# Patient Record
Sex: FEMALE | Race: WHITE | NOT HISPANIC OR LATINO | Employment: FULL TIME | URBAN - METROPOLITAN AREA
[De-identification: names, ages, dates, MRNs, and addresses within clinical notes are randomized per-mention and may not be internally consistent; named-entity substitution may affect disease eponyms.]

---

## 2023-07-04 PROBLEM — Z00.00 ROUTINE ADULT HEALTH MAINTENANCE: Status: ACTIVE | Noted: 2023-07-04

## 2023-07-04 PROBLEM — E55.9 VITAMIN D DEFICIENCY: Status: ACTIVE | Noted: 2018-11-01

## 2023-07-04 PROBLEM — F41.9 ANXIETY: Status: ACTIVE | Noted: 2023-07-04

## 2023-07-04 PROBLEM — Z87.81 H/O FRACTURE: Status: ACTIVE | Noted: 2023-07-04

## 2023-07-04 PROBLEM — R73.01 IMPAIRED FASTING GLUCOSE: Status: ACTIVE | Noted: 2023-07-04

## 2023-07-04 PROBLEM — M17.12 UNILATERAL PRIMARY OSTEOARTHRITIS, LEFT KNEE: Status: ACTIVE | Noted: 2018-10-31

## 2023-07-04 PROBLEM — K58.9 IRRITABLE BOWEL SYNDROME: Status: ACTIVE | Noted: 2018-11-01

## 2023-07-04 PROBLEM — E53.8 VITAMIN B12 DEFICIENCY: Status: ACTIVE | Noted: 2023-07-04

## 2023-07-04 PROBLEM — I25.10 CORONARY ARTERY DISEASE: Status: ACTIVE | Noted: 2018-11-01

## 2023-07-17 ENCOUNTER — TELEPHONE (OUTPATIENT)
Dept: PRIMARY CARE | Facility: CLINIC | Age: 58
End: 2023-07-17
Payer: COMMERCIAL

## 2023-07-17 DIAGNOSIS — E78.5 HYPERLIPIDEMIA, UNSPECIFIED HYPERLIPIDEMIA TYPE: Primary | ICD-10-CM

## 2023-07-17 DIAGNOSIS — E53.8 VITAMIN B12 DEFICIENCY: ICD-10-CM

## 2023-07-17 DIAGNOSIS — Z00.00 ROUTINE ADULT HEALTH MAINTENANCE: ICD-10-CM

## 2023-07-17 DIAGNOSIS — E55.9 VITAMIN D DEFICIENCY: ICD-10-CM

## 2023-07-17 DIAGNOSIS — R73.01 IMPAIRED FASTING GLUCOSE: ICD-10-CM

## 2023-07-17 NOTE — TELEPHONE ENCOUNTER
Patient has upcoming appt on 8/1/23 for CPE calling for lab orders to do ahead of visit. Thank you.

## 2023-07-20 ENCOUNTER — LAB (OUTPATIENT)
Dept: LAB | Facility: LAB | Age: 58
End: 2023-07-20
Payer: COMMERCIAL

## 2023-07-20 DIAGNOSIS — E55.9 VITAMIN D DEFICIENCY: ICD-10-CM

## 2023-07-20 DIAGNOSIS — E53.8 VITAMIN B12 DEFICIENCY: ICD-10-CM

## 2023-07-20 DIAGNOSIS — Z00.00 ROUTINE ADULT HEALTH MAINTENANCE: ICD-10-CM

## 2023-07-20 DIAGNOSIS — R73.01 IMPAIRED FASTING GLUCOSE: ICD-10-CM

## 2023-07-20 DIAGNOSIS — E78.5 HYPERLIPIDEMIA, UNSPECIFIED HYPERLIPIDEMIA TYPE: ICD-10-CM

## 2023-07-20 LAB
ALANINE AMINOTRANSFERASE (SGPT) (U/L) IN SER/PLAS: 18 U/L (ref 7–45)
ALBUMIN (G/DL) IN SER/PLAS: 4.3 G/DL (ref 3.4–5)
ALKALINE PHOSPHATASE (U/L) IN SER/PLAS: 65 U/L (ref 33–110)
ANION GAP IN SER/PLAS: 12 MMOL/L (ref 10–20)
APPEARANCE, URINE: CLEAR
ASPARTATE AMINOTRANSFERASE (SGOT) (U/L) IN SER/PLAS: 26 U/L (ref 9–39)
BASOPHILS (10*3/UL) IN BLOOD BY AUTOMATED COUNT: 0.06 X10E9/L (ref 0–0.1)
BASOPHILS/100 LEUKOCYTES IN BLOOD BY AUTOMATED COUNT: 1.6 % (ref 0–2)
BILIRUBIN TOTAL (MG/DL) IN SER/PLAS: 0.5 MG/DL (ref 0–1.2)
BILIRUBIN, URINE: NEGATIVE
BLOOD, URINE: NEGATIVE
CALCIDIOL (25 OH VITAMIN D3) (NG/ML) IN SER/PLAS: 47 NG/ML
CALCIUM (MG/DL) IN SER/PLAS: 9.5 MG/DL (ref 8.6–10.3)
CARBON DIOXIDE, TOTAL (MMOL/L) IN SER/PLAS: 29 MMOL/L (ref 21–32)
CHLORIDE (MMOL/L) IN SER/PLAS: 100 MMOL/L (ref 98–107)
CHOLESTEROL (MG/DL) IN SER/PLAS: 207 MG/DL (ref 0–199)
CHOLESTEROL IN HDL (MG/DL) IN SER/PLAS: 81.6 MG/DL
CHOLESTEROL/HDL RATIO: 2.5
COBALAMIN (VITAMIN B12) (PG/ML) IN SER/PLAS: 494 PG/ML (ref 211–911)
COLOR, URINE: YELLOW
CREATININE (MG/DL) IN SER/PLAS: 0.79 MG/DL (ref 0.5–1.05)
EOSINOPHILS (10*3/UL) IN BLOOD BY AUTOMATED COUNT: 0.17 X10E9/L (ref 0–0.7)
EOSINOPHILS/100 LEUKOCYTES IN BLOOD BY AUTOMATED COUNT: 4.5 % (ref 0–6)
ERYTHROCYTE DISTRIBUTION WIDTH (RATIO) BY AUTOMATED COUNT: 12.5 % (ref 11.5–14.5)
ERYTHROCYTE MEAN CORPUSCULAR HEMOGLOBIN CONCENTRATION (G/DL) BY AUTOMATED: 33.8 G/DL (ref 32–36)
ERYTHROCYTE MEAN CORPUSCULAR VOLUME (FL) BY AUTOMATED COUNT: 93 FL (ref 80–100)
ERYTHROCYTES (10*6/UL) IN BLOOD BY AUTOMATED COUNT: 4.42 X10E12/L (ref 4–5.2)
ESTIMATED AVERAGE GLUCOSE FOR HBA1C: 103 MG/DL
GFR FEMALE: 86 ML/MIN/1.73M2
GLUCOSE (MG/DL) IN SER/PLAS: 93 MG/DL (ref 74–99)
GLUCOSE, URINE: NEGATIVE MG/DL
HEMATOCRIT (%) IN BLOOD BY AUTOMATED COUNT: 41.1 % (ref 36–46)
HEMOGLOBIN (G/DL) IN BLOOD: 13.9 G/DL (ref 12–16)
HEMOGLOBIN A1C/HEMOGLOBIN TOTAL IN BLOOD: 5.2 %
IMMATURE GRANULOCYTES/100 LEUKOCYTES IN BLOOD BY AUTOMATED COUNT: 0 % (ref 0–0.9)
KETONES, URINE: NEGATIVE MG/DL
LDL: 109 MG/DL (ref 0–99)
LEUKOCYTE ESTERASE, URINE: NEGATIVE
LEUKOCYTES (10*3/UL) IN BLOOD BY AUTOMATED COUNT: 3.8 X10E9/L (ref 4.4–11.3)
LYMPHOCYTES (10*3/UL) IN BLOOD BY AUTOMATED COUNT: 1.32 X10E9/L (ref 1.2–4.8)
LYMPHOCYTES/100 LEUKOCYTES IN BLOOD BY AUTOMATED COUNT: 35 % (ref 13–44)
MONOCYTES (10*3/UL) IN BLOOD BY AUTOMATED COUNT: 0.35 X10E9/L (ref 0.1–1)
MONOCYTES/100 LEUKOCYTES IN BLOOD BY AUTOMATED COUNT: 9.3 % (ref 2–10)
NEUTROPHILS (10*3/UL) IN BLOOD BY AUTOMATED COUNT: 1.87 X10E9/L (ref 1.2–7.7)
NEUTROPHILS/100 LEUKOCYTES IN BLOOD BY AUTOMATED COUNT: 49.6 % (ref 40–80)
NITRITE, URINE: NEGATIVE
PH, URINE: 6 (ref 5–8)
PLATELETS (10*3/UL) IN BLOOD AUTOMATED COUNT: 183 X10E9/L (ref 150–450)
POTASSIUM (MMOL/L) IN SER/PLAS: 4.4 MMOL/L (ref 3.5–5.3)
PROTEIN TOTAL: 7.1 G/DL (ref 6.4–8.2)
PROTEIN, URINE: NEGATIVE MG/DL
SODIUM (MMOL/L) IN SER/PLAS: 137 MMOL/L (ref 136–145)
SPECIFIC GRAVITY, URINE: 1.01 (ref 1–1.03)
THYROTROPIN (MIU/L) IN SER/PLAS BY DETECTION LIMIT <= 0.05 MIU/L: 2.73 MIU/L (ref 0.44–3.98)
TRIGLYCERIDE (MG/DL) IN SER/PLAS: 84 MG/DL (ref 0–149)
UREA NITROGEN (MG/DL) IN SER/PLAS: 14 MG/DL (ref 6–23)
UROBILINOGEN, URINE: <2 MG/DL (ref 0–1.9)
VLDL: 17 MG/DL (ref 0–40)

## 2023-07-20 PROCEDURE — 85025 COMPLETE CBC W/AUTO DIFF WBC: CPT

## 2023-07-20 PROCEDURE — 80061 LIPID PANEL: CPT

## 2023-07-20 PROCEDURE — 83036 HEMOGLOBIN GLYCOSYLATED A1C: CPT

## 2023-07-20 PROCEDURE — 82607 VITAMIN B-12: CPT

## 2023-07-20 PROCEDURE — 36415 COLL VENOUS BLD VENIPUNCTURE: CPT

## 2023-07-20 PROCEDURE — 80053 COMPREHEN METABOLIC PANEL: CPT

## 2023-07-20 PROCEDURE — 81003 URINALYSIS AUTO W/O SCOPE: CPT

## 2023-07-20 PROCEDURE — 84443 ASSAY THYROID STIM HORMONE: CPT

## 2023-07-20 PROCEDURE — 82306 VITAMIN D 25 HYDROXY: CPT

## 2023-07-25 ASSESSMENT — PROMIS GLOBAL HEALTH SCALE
RATE_MENTAL_HEALTH: VERY GOOD
EMOTIONAL_PROBLEMS: RARELY
RATE_GENERAL_HEALTH: VERY GOOD
RATE_SOCIAL_SATISFACTION: VERY GOOD
RATE_AVERAGE_PAIN: 1
RATE_PHYSICAL_HEALTH: VERY GOOD
RATE_QUALITY_OF_LIFE: VERY GOOD
CARRYOUT_PHYSICAL_ACTIVITIES: COMPLETELY
RATE_AVERAGE_FATIGUE: MILD
CARRYOUT_SOCIAL_ACTIVITIES: VERY GOOD

## 2023-08-01 ENCOUNTER — OFFICE VISIT (OUTPATIENT)
Dept: PRIMARY CARE | Facility: CLINIC | Age: 58
End: 2023-08-01
Payer: COMMERCIAL

## 2023-08-01 VITALS
TEMPERATURE: 98 F | WEIGHT: 116.5 LBS | OXYGEN SATURATION: 98 % | HEART RATE: 63 BPM | HEIGHT: 63 IN | DIASTOLIC BLOOD PRESSURE: 86 MMHG | SYSTOLIC BLOOD PRESSURE: 130 MMHG | BODY MASS INDEX: 20.64 KG/M2

## 2023-08-01 DIAGNOSIS — Z13.820 SCREENING FOR OSTEOPOROSIS: ICD-10-CM

## 2023-08-01 DIAGNOSIS — E55.9 VITAMIN D DEFICIENCY: ICD-10-CM

## 2023-08-01 DIAGNOSIS — E78.5 HYPERLIPIDEMIA, UNSPECIFIED HYPERLIPIDEMIA TYPE: ICD-10-CM

## 2023-08-01 DIAGNOSIS — F41.9 ANXIETY: ICD-10-CM

## 2023-08-01 DIAGNOSIS — E53.8 VITAMIN B12 DEFICIENCY: ICD-10-CM

## 2023-08-01 DIAGNOSIS — R73.01 IMPAIRED FASTING GLUCOSE: ICD-10-CM

## 2023-08-01 DIAGNOSIS — Z12.31 ENCOUNTER FOR SCREENING MAMMOGRAM FOR BREAST CANCER: Primary | ICD-10-CM

## 2023-08-01 DIAGNOSIS — D72.819 LEUKOPENIA, UNSPECIFIED TYPE: ICD-10-CM

## 2023-08-01 DIAGNOSIS — Z00.00 ROUTINE ADULT HEALTH MAINTENANCE: ICD-10-CM

## 2023-08-01 PROBLEM — I25.10 CORONARY ARTERY DISEASE: Status: RESOLVED | Noted: 2018-11-01 | Resolved: 2023-08-01

## 2023-08-01 PROCEDURE — 1036F TOBACCO NON-USER: CPT | Performed by: INTERNAL MEDICINE

## 2023-08-01 PROCEDURE — 99396 PREV VISIT EST AGE 40-64: CPT | Performed by: INTERNAL MEDICINE

## 2023-08-01 RX ORDER — ASCORBIC ACID 500 MG
500 TABLET ORAL
COMMUNITY

## 2023-08-01 RX ORDER — LANOLIN ALCOHOL/MO/W.PET/CERES
1 CREAM (GRAM) TOPICAL DAILY
COMMUNITY
Start: 2019-11-27

## 2023-08-01 RX ORDER — MULTIVITAMIN
1 TABLET ORAL DAILY
COMMUNITY
Start: 2021-07-22

## 2023-08-01 RX ORDER — BLOOD-GLUCOSE SENSOR
EACH MISCELLANEOUS
Qty: 1 EACH | Refills: 0 | Status: SHIPPED | OUTPATIENT
Start: 2023-08-01

## 2023-08-01 RX ORDER — ALPRAZOLAM 0.5 MG/1
0.5 TABLET ORAL 3 TIMES DAILY PRN
COMMUNITY
Start: 2020-06-25 | End: 2023-08-01 | Stop reason: SDUPTHER

## 2023-08-01 RX ORDER — ACETAMINOPHEN 500 MG
50 TABLET ORAL DAILY
COMMUNITY
Start: 2018-10-16

## 2023-08-01 RX ORDER — ALPRAZOLAM 0.5 MG/1
0.5 TABLET ORAL 3 TIMES DAILY PRN
Qty: 21 TABLET | Refills: 0 | Status: SHIPPED | OUTPATIENT
Start: 2023-08-01 | End: 2023-08-08

## 2023-08-01 ASSESSMENT — PATIENT HEALTH QUESTIONNAIRE - PHQ9
2. FEELING DOWN, DEPRESSED OR HOPELESS: NOT AT ALL
SUM OF ALL RESPONSES TO PHQ9 QUESTIONS 1 AND 2: 0
1. LITTLE INTEREST OR PLEASURE IN DOING THINGS: NOT AT ALL

## 2023-08-01 NOTE — ASSESSMENT & PLAN NOTE
Annual Wellness exam completed   Preventive Health history reviewed:  Vaccines today: none  Labs ordered    Mammogram ordered  BMD if covered  Depression Screening done

## 2023-08-01 NOTE — PROGRESS NOTES
Reason for Visit: Annual Physical Exam    Assessment and Plan:  Problem List Items Addressed This Visit          High    Routine adult health maintenance    Overview     Influenza 9/25/20, 10/1/21, declined further   Shingrix 7/25/22, 10/3/22  Tdap (Age 7+)2/26/2013, 7/25/22  Moderna COVID 19 vaccine 3/13/21, 4/10/21, 12/12/21   Cscope 2018 (4yrs); 7/21/23 (4yrs)  Mamm: 9/18/17; 8/3/20, 10/26/21, 10/25/22  PAP/HPV 8/21/15 (-); 9/24/20 (-)  Hep C Ab (-) 5/4/18         Current Assessment & Plan     Annual Wellness exam completed   Preventive Health history reviewed:  Vaccines today: none  Labs ordered    Mammogram ordered  BMD if covered  Depression Screening done         Relevant Orders    Urinalysis with Reflex Microscopic    Comprehensive Metabolic Panel    CBC and Auto Differential    Lipid Panel       Medium    Anxiety    Overview      Buspar not effective  will try prn Xanax         Relevant Medications    ALPRAZolam (Xanax) 0.5 mg tablet    RESOLVED: Hyperlipidemia    Relevant Orders    TSH with reflex to Free T4 if abnormal    Impaired fasting glucose    Overview     Diet managed         Relevant Medications    blood-glucose sensor (FreeStyle Porter 3 Sensor) device    Other Relevant Orders    Hemoglobin A1c    Leukopenia    Overview     7/23: 3.8  Will monitor         Relevant Orders    CBC and Auto Differential    Vitamin B12 deficiency    Overview     2019: 233  On oral supplement         Relevant Orders    Vitamin B12    Vitamin D deficiency    Overview     2019: 26  On supplement         Relevant Orders    Vitamin D, Total     Other Visit Diagnoses       Encounter for screening mammogram for breast cancer    -  Primary    Relevant Orders    BI mammo bilateral screening tomosynthesis    Screening for osteoporosis        Relevant Orders    XR DEXA bone density              HPI:  MRI brain done 8/22: There is minimal mucosal thickening within a few scattered ethmoid air cells  Symptoms went away    Labs  reviewed:  Component      Latest Ref Rn 7/20/2023   WBC      4.4 - 11.3 x10E9/L 3.8 (L)    RBC      4.00 - 5.20 x10E12/L 4.42    HEMOGLOBIN      12.0 - 16.0 g/dL 13.9    HEMATOCRIT      36.0 - 46.0 % 41.1    MCV      80 - 100 fL 93    MCHC      32.0 - 36.0 g/dL 33.8    Platelets      150 - 450 x10E9/L 183    RED CELL DISTRIBUTION WIDTH      11.5 - 14.5 % 12.5    Neutrophils %      40.0 - 80.0 % 49.6    Immature Granulocytes %, Automated      0.0 - 0.9 % 0.0    Lymphocytes %      13.0 - 44.0 % 35.0    Monocytes %      2.0 - 10.0 % 9.3    Eosinophils %      0.0 - 6.0 % 4.5    Basophils %      0.0 - 2.0 % 1.6    Neutrophils Absolute      1.20 - 7.70 x10E9/L 1.87    Lymphocytes Absolute      1.20 - 4.80 x10E9/L 1.32    Monocytes Absolute      0.10 - 1.00 x10E9/L 0.35    Eosinophils Absolute      0.00 - 0.70 x10E9/L 0.17    Basophils Absolute      0.00 - 0.10 x10E9/L 0.06    GLUCOSE      74 - 99 mg/dL 93    SODIUM      136 - 145 mmol/L 137    POTASSIUM      3.5 - 5.3 mmol/L 4.4    CHLORIDE      98 - 107 mmol/L 100    Bicarbonate      21 - 32 mmol/L 29    Anion Gap      10 - 20 mmol/L 12    Blood Urea Nitrogen      6 - 23 mg/dL 14    Creatinine      0.50 - 1.05 mg/dL 0.79    GFR Female      >90 mL/min/1.73m2 86    Calcium      8.6 - 10.3 mg/dL 9.5    Albumin      3.4 - 5.0 g/dL 4.3    Alkaline Phosphatase      33 - 110 U/L 65    Total Protein      6.4 - 8.2 g/dL 7.1    AST      9 - 39 U/L 26    Bilirubin Total      0.0 - 1.2 mg/dL 0.5    ALT      7 - 45 U/L 18    Color, Urine      STRAW,YELLOW  YELLOW    Appearance, Urine      CLEAR  CLEAR    Specific Gravity, Urine      1.005 - 1.035  1.010    pH, Urine      5.0 - 8.0  6.0    Protein, Urine      NEGATIVE mg/dL NEGATIVE    Glucose, Urine      NEGATIVE mg/dL NEGATIVE    Blood, Urine      NEGATIVE  NEGATIVE    Ketones, Urine      NEGATIVE mg/dL NEGATIVE    Bilirubin, Urine      NEGATIVE  NEGATIVE    Urobilinogen, Urine      0.0 - 1.9 mg/dL <2.0    Nitrite, Urine       "NEGATIVE  NEGATIVE    Leukocyte Esterase, Urine      NEGATIVE  NEGATIVE    CHOLESTEROL      0 - 199 mg/dL 207 (H)    HDL CHOLESTEROL      mg/dL 81.6    Cholesterol/HDL Ratio 2.5    LDL      0 - 99 mg/dL 109 (H)    VLDL      0 - 40 mg/dL 17    TRIGLYCERIDES      0 - 149 mg/dL 84    Hemoglobin A1C      % 5.2    Estimated Average Glucose      MG/    Vitamin D, 25-Hydroxy, Total      ng/mL 47    Thyroid Stimulating Hormone      0.44 - 3.98 mIU/L 2.73    Vitamin B12      211 - 911 pg/mL 494         ROS otherwise negative aside from what was mentioned above in HPI.    Vitals  /86   Pulse 63   Temp 36.7 °C (98 °F)   Ht 1.588 m (5' 2.5\")   Wt 52.8 kg (116 lb 8 oz)   SpO2 98%   BMI 20.97 kg/m²   Body mass index is 20.97 kg/m².  Physical Exam  Gen: Alert, NAD  HEENT:  Normal exam  Neck:  No masses/nodes palpable; Thyroid nontender and without nodules; No DAVID  Respiratory:  Lungs CTAB  CV: RRR  Neuro:  Gross motor and sensory intact  Skin:  No suspicious lesions present  Breast: No masses or axillary lymphadenopathy  Gyn: Normal pelvic exam: no uterine masses or cervical lesions, or CMT; no vaginal D/C. No ovarian or adnexal masses; No external vaginal or anal/perineal lesions (Pt declined chaperone)      Active Problem List  Patient Active Problem List   Diagnosis    Routine adult health maintenance    H/O fracture    Anxiety    Impaired fasting glucose    Irritable bowel syndrome    Unilateral primary osteoarthritis, left knee    Vitamin B12 deficiency    Vitamin D deficiency    Leukopenia       Comprehensive Medical/Surgical/Social/Family History  Past Medical History:   Diagnosis Date    H/O abdominal ultrasound     US abd : normal    H/O CT scan     : CT calcium score =0    H/O magnetic resonance imaging of brain and brain stem     : minimal mucosal thickening within a few scattered ethmoid air cells.     Past Surgical History:   Procedure Laterality Date     SECTION, CLASSIC  " 10/11/2018     Section    COLONOSCOPY  10/16/2018    7/21/23: melanosis coli, diverticulosis sigmpoid colon;  (-) : normal     Social History     Social History Narrative    , 3 kids    [facility]    Nonsmoker    Social ETOH    ---    Family History:    F: HTN, Lipids, DM,  Colon CA age 80 ( 87)    M:  CAD/CABG age 79      B:  DM, addiction problem/cocaine, Stroke age 48 ( age 48)    B:                                                                       Niece - leukemia    MGM: CAD                 MGF:  Coronary Artery Disease                      PGM: Coronary Artery Disease                           PGF:  Coronary Artery Disease                                 Allergies and Medications  Patient has no known allergies.  Current Outpatient Medications   Medication Instructions    ALPRAZolam (XANAX) 0.5 mg, oral, 3 times daily PRN    ascorbic acid (VITAMIN C) 500 mg, oral, Daily RT    blood-glucose sensor (FreeStyle Porter 3 Sensor) device Use for 2 weeks as directed    cholecalciferol (VITAMIN D-3) 50 mcg, oral, Daily    cyanocobalamin (Vitamin B-12) 1,000 mcg tablet 1 tablet, oral, Daily    L. acidophilus/Bifid. animalis 32 billion cell capsule 1 capsule, oral, Daily    multivitamin tablet 1 tablet, oral, Daily    NON FORMULARY 1 each, oral, Daily

## 2024-04-17 ENCOUNTER — OFFICE VISIT (OUTPATIENT)
Dept: ORTHOPEDIC SURGERY | Facility: CLINIC | Age: 59
End: 2024-04-17
Payer: COMMERCIAL

## 2024-04-17 ENCOUNTER — HOSPITAL ENCOUNTER (OUTPATIENT)
Dept: RADIOLOGY | Facility: CLINIC | Age: 59
Discharge: HOME | End: 2024-04-17
Payer: COMMERCIAL

## 2024-04-17 DIAGNOSIS — M25.562 LEFT KNEE PAIN, UNSPECIFIED CHRONICITY: ICD-10-CM

## 2024-04-17 DIAGNOSIS — M17.12 PRIMARY OSTEOARTHRITIS OF LEFT KNEE: Primary | ICD-10-CM

## 2024-04-17 PROCEDURE — 99213 OFFICE O/P EST LOW 20 MIN: CPT | Performed by: ORTHOPAEDIC SURGERY

## 2024-04-17 PROCEDURE — 73564 X-RAY EXAM KNEE 4 OR MORE: CPT | Mod: LT

## 2024-04-17 PROCEDURE — 73564 X-RAY EXAM KNEE 4 OR MORE: CPT | Mod: LEFT SIDE | Performed by: ORTHOPAEDIC SURGERY

## 2024-04-17 PROCEDURE — 99203 OFFICE O/P NEW LOW 30 MIN: CPT | Performed by: ORTHOPAEDIC SURGERY

## 2024-04-17 NOTE — PROGRESS NOTES
History: Tiana is here for her left knee.  She continues to try to run any activity and noticed increased pain after running.  She is trying to train for a half marathon.  It radiated down the back of the knee into the Achilles.  She takes occasional medicine as needed.    Past medical history: Multiple  Medications: Multiple  Allergies: No known drug allergies    Please refer to the intake H&P regarding the patient's review of systems, family history and social history as was done today    HEENT: Normal  Lungs: Clear to auscultation  Heart: RRR  Abdomen: Soft, nontender  Skin: clear  Extremity: She has mild tenderness over the medial and posterior aspect of the knee today.  Negative Lachman and posterior drawer.  Mild laxity with varus stress.  Mildly positive posterior Dimple's maneuver.  No numbness.  Contralateral exam is normal for strength, motion, stability and neurovascular assessment.    Radiographs: AP lateral and sunrise views of the left knee shows moderate medial joint space narrowing with neutral alignment.    Assessment: Moderate left knee DJD in an  Active patient      Plan: She is very active including running but is back down on some of her impact activities.  She may be getting a bit of arthritis irritating the posterior musculature and down into her Achilles.  She would like to try an injection and will slowly get back into running activities if improving.  Will see her back in 6 weeks to assess progress.  If not improving we did discuss further imaging or other modalities.  All questions were answered today with the patient.    This note was generated with voice recognition software and may contain grammatical errors.

## 2024-05-15 ENCOUNTER — TELEPHONE (OUTPATIENT)
Dept: ORTHOPEDIC SURGERY | Facility: CLINIC | Age: 59
End: 2024-05-15
Payer: COMMERCIAL

## 2024-05-15 DIAGNOSIS — M25.579 ACUTE ANKLE PAIN, UNSPECIFIED LATERALITY: Primary | ICD-10-CM

## 2024-05-15 RX ORDER — MELOXICAM 15 MG/1
15 TABLET ORAL DAILY
Qty: 30 TABLET | Refills: 2 | Status: SHIPPED | OUTPATIENT
Start: 2024-05-15 | End: 2024-08-13

## 2024-05-15 NOTE — TELEPHONE ENCOUNTER
Patient states she is still having trouble with her achilles tendon and was wondering if an anti inflammatory would help her can you please call her.

## 2024-06-03 ENCOUNTER — OFFICE VISIT (OUTPATIENT)
Dept: ORTHOPEDIC SURGERY | Facility: CLINIC | Age: 59
End: 2024-06-03
Payer: COMMERCIAL

## 2024-06-03 DIAGNOSIS — M17.12 PRIMARY OSTEOARTHRITIS OF LEFT KNEE: Primary | ICD-10-CM

## 2024-06-03 DIAGNOSIS — M76.62 ACHILLES TENDINITIS OF LEFT LOWER EXTREMITY: ICD-10-CM

## 2024-06-03 PROCEDURE — 99213 OFFICE O/P EST LOW 20 MIN: CPT | Performed by: ORTHOPAEDIC SURGERY

## 2024-06-03 NOTE — PROGRESS NOTES
History: Tiana here for her left knee and Achilles.  Overall her knee is doing better.  She still taking the meloxicam.  She is avoided running and the impact activities.  She is more concerned about the persistent soreness in the Achilles.  She did have injections in this area many years ago.    Past medical history: Multiple  Medications: Multiple  Allergies: No known drug allergies    Please refer to the intake H&P regarding the patient's review of systems, family history and social history as was done today    HEENT: Normal  Lungs: Clear to auscultation  Heart: RRR  Abdomen: Soft, nontender  Skin: clear  Extremity: She has mild tenderness around the Achilles 3 to 4 cm above the insertion.  No significant swelling.  She has a normal White's test.  She is full foot and ankle motion.  Knee exam shows good range of motion again with minimal pain today.  Negative Lachman and posterior drawer.  Contralateral exam is normal for strength, motion, stability and neurovascular assessment.    Radiographs: Previous x-rays show some moderate degenerative change in the knee.    Assessment: Stable left knee DJD, left Achilles tendinitis    Plan: Overall her knee is doing better.  She is back down on some of the running and is using the meloxicam.  She is more concerned about the Achilles tendinitis.  She is doing a lot of stretching and home exercises and would like to try some formal therapy with modalities including ultrasound.  She can use the meloxicam if needed but will begin to wean off if doing better.  She gradually increase some running activities as well.  I would be happy to see her back for either area as needed.  All questions were answered today with the patient.    This note was generated with voice recognition software and may contain grammatical errors.

## 2024-06-10 ENCOUNTER — EVALUATION (OUTPATIENT)
Dept: PHYSICAL THERAPY | Facility: CLINIC | Age: 59
End: 2024-06-10
Payer: COMMERCIAL

## 2024-06-10 DIAGNOSIS — M62.89 MUSCLE TIGHTNESS: ICD-10-CM

## 2024-06-10 DIAGNOSIS — M25.572 LEFT ANKLE PAIN: Primary | ICD-10-CM

## 2024-06-10 DIAGNOSIS — M76.62 ACHILLES TENDINITIS OF LEFT LOWER EXTREMITY: ICD-10-CM

## 2024-06-10 PROCEDURE — 97110 THERAPEUTIC EXERCISES: CPT | Mod: GP | Performed by: PHYSICAL THERAPIST

## 2024-06-10 PROCEDURE — 97161 PT EVAL LOW COMPLEX 20 MIN: CPT | Mod: GP | Performed by: PHYSICAL THERAPIST

## 2024-06-10 ASSESSMENT — PAIN SCALES - GENERAL: PAINLEVEL_OUTOF10: 2

## 2024-06-10 ASSESSMENT — PAIN - FUNCTIONAL ASSESSMENT: PAIN_FUNCTIONAL_ASSESSMENT: 0-10

## 2024-06-10 NOTE — PROGRESS NOTES
Physical Therapy    Physical Therapy Evaluation and Treatment      Patient Name: Tiana Ackerman  MRN: [id number]  Today's Date: 6/16/2024     Insurance:  [employer] PPO through [employer]  20% coins, $350 ded ($350 met), $2000 oop ($640.92 met), no copay, bmn radha yr, no PA   Visit 1 of 20 (re-assess at 10)    Assessment:    Pt is a 58 yo female presenting with L foot/lower leg pain, limited running and plyometric tolerances consistent with tendon strain and mechanical inefficiencies. Skilled PT is recommended to address these issues with manual tissue and joint mobilization, stretching, progressive eccentric>concentric strengthening and proprioception training so that pt is better able to support herself and restore functional tolerances.    Plan:  OP PT Plan  Treatment/Interventions: Aquatic therapy, Dry needling, Education/ Instruction, Electrical stimulation, Manual therapy, Gait training, Neuromuscular re-education, Self care/ home management, Therapeutic activities, Therapeutic exercises, Ultrasound, Vasopneumatic device  PT Plan: Skilled PT  PT Frequency:  (1-2x/week)  Duration: 20  Rehab Potential: Good  Plan of Care Agreement: Patient    Current Problem:   1. Left ankle pain  Follow Up In Physical Therapy      2. Achilles tendinitis of left lower extremity  Referral to Physical Therapy      3. Muscle tightness            Subjective    General:   Mid April 2024, she noted a strain/tightness in the L calf. She was training for a half marathon, but the symptoms got signif worse, so she stopped and canceled her half marathon.   She had an injection in the L knee 4- and that helped her knee pain but not the Achilles.  She cont to have pain with running and jumping rope, and notes that she is not fully able to do all desired activities as she was prior.   She does note that she recently got [employer]OK[employer] shoes with an arch support, and it seems to require more flex in her foot, which has not `been  comfortable.    Precautions:       Pain:   Current: 1-2 in ant knee and 1/10 in the L Achilles.  Worst: 3 more of a strain than a pain  Best: 0  Location: Achilles tendon.  Often feels her pain first thing in the AM and in the evenings.  Home Living:     Current Level of Function:   walking mariaa= no limitation or pain, though some pain when walking uphill.   standing mariaa= unlimited.  stairs: recip gait up and down without handrail without limitation.  sit to stand transfers:  no pain or difficulty.  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    Objective   Standing repeated extn: 2x10 no change in symptoms    Gait: non-antalgic    Neuro: unremarkable for sensory/motor issues.    AROM R/L:  Long sit DF: 4*/9*  Short sit DF: 10*/13*  PF: 60*/55*  Inversion: 42*/35*  Eversion: 22*/15*  Hallux ext: 62*/58*    Strength R, L:  DF: 33#, 36#  PF: 4+, 4+  Inv: 22#, 19#  Ever: 24#, 23#    Outcome Measures:    LEFS 75/80    Treatments:  Ther Ex 43040: 20/1  Prone press ups, 10x  Gastroc, soleus, and FHL stretching in standing, :30 1x each  Eccentric heel raises, 10x  Pt was edu'd on using a massage roller to her calf prior to stretching to facilitate better tissue extensibility prior to stretching.    EDUCATION:       Goals:  Goals to be achieved by discharge, approx 12 weeks.    Patient will verbalize pain (0-10) = 0/10 at rest and 2/10 at worst with activity.    Patient will correctly perform home exercise program to progress current functional status.    LEFS score will be >  80/80 to demonstrate overall improved functional abilities.     Pt will return to running > 5 miles without inc pain or difficulty, so tat she can resume training for distance races that she is used to.     Increase AROM: ankle PF= 65*, ever= 20*, hallux ext= 65*    Increase DF= 40#, PF to 5/5, inv= 30#

## 2024-06-21 ENCOUNTER — TREATMENT (OUTPATIENT)
Dept: PHYSICAL THERAPY | Facility: CLINIC | Age: 59
End: 2024-06-21
Payer: COMMERCIAL

## 2024-06-21 ENCOUNTER — APPOINTMENT (OUTPATIENT)
Dept: PHYSICAL THERAPY | Facility: CLINIC | Age: 59
End: 2024-06-21
Payer: COMMERCIAL

## 2024-06-21 DIAGNOSIS — M62.89 MUSCLE TIGHTNESS: Primary | ICD-10-CM

## 2024-06-21 DIAGNOSIS — M25.572 LEFT ANKLE PAIN: ICD-10-CM

## 2024-06-21 PROCEDURE — 97112 NEUROMUSCULAR REEDUCATION: CPT | Mod: GP | Performed by: PHYSICAL THERAPIST

## 2024-06-21 PROCEDURE — 97110 THERAPEUTIC EXERCISES: CPT | Mod: GP | Performed by: PHYSICAL THERAPIST

## 2024-06-21 PROCEDURE — 97140 MANUAL THERAPY 1/> REGIONS: CPT | Mod: GP | Performed by: PHYSICAL THERAPIST

## 2024-06-21 ASSESSMENT — PAIN - FUNCTIONAL ASSESSMENT: PAIN_FUNCTIONAL_ASSESSMENT: 0-10

## 2024-06-21 ASSESSMENT — PAIN SCALES - GENERAL: PAINLEVEL_OUTOF10: 3

## 2024-06-21 NOTE — PROGRESS NOTES
Physical Therapy    Physical Therapy Treatment    Patient Name: Tiana Ackerman  MRN: [id number]  Insurance:  [employer] PPO through "[employer]  20% coins, $350 ded ($350 met), $2000 oop ($640.92 met), no copay, bmn radha yr, no PA   Visit 2 of 20 (re-assess at 10)  Today's Date: 6/26/2024        PT Therapeutic Procedures Time Entry  Manual Therapy Time Entry: 24  Neuromuscular Re-Education Time Entry: 10  Therapeutic Exercise Time Entry: 15                 Current Problem  1. Muscle tightness        2. Left ankle pain  Follow Up In Physical Therapy        Subjective    General   Pt notes that she has been doing HEP without much difficulty, except couldn't remember exactly how to do FHL stretch.   She reports that she is able to tolerate jump roping without inc pain. She notes that she cont to have pain with running.      Precautions        Objective     Treatments:  Ther Ex 61562: 15/1  NuStep (seat 6) L1 5 min  Prone press ups, 10x  Gastroc, soleus, and FHL stretching in standing, :30 1x each  Eccentric heel raises, 10x    Neuromusc Re-edu 53239: 10 min  Tband hip x4, red, 10x each bilat  C-C walkout x4, 15#, 5x each    Manual Ther Tech 11939: 24/2  DTM to sides of Achilles.   IDN: single 40mm placed into the proximal FHL musculotendinous junction.   Pt was edu'd on the process of dry needling, as well as how to monitor for light bruising. Pt was also edu'd to maintain good hydration and move as normally as possible, avoiding pain when needed.  Verbal consent obtained for DN procedure after explaining the process.      OP EDUCATION:     Assessment:   Pt reported feeling looser after MTT. FHL was palpably tight initially. She was able to manage the progressions without inc pain. At this time, given the tightness and strain in the FHL, it was recommended that pt avoid plyometric activity such as jumping and heavy running, or even light running if it is painful.     Plan:  F/U with pt re: effect of IDN. Progress  eccentric strengthening if it was tolerated well.    Goals:

## 2024-06-27 ENCOUNTER — TREATMENT (OUTPATIENT)
Dept: PHYSICAL THERAPY | Facility: CLINIC | Age: 59
End: 2024-06-27
Payer: COMMERCIAL

## 2024-06-27 DIAGNOSIS — M62.89 MUSCLE TIGHTNESS: Primary | ICD-10-CM

## 2024-06-27 DIAGNOSIS — M25.572 LEFT ANKLE PAIN: ICD-10-CM

## 2024-06-27 PROCEDURE — 97140 MANUAL THERAPY 1/> REGIONS: CPT | Mod: GP | Performed by: PHYSICAL THERAPIST

## 2024-06-27 PROCEDURE — 97110 THERAPEUTIC EXERCISES: CPT | Mod: GP | Performed by: PHYSICAL THERAPIST

## 2024-06-27 ASSESSMENT — PAIN - FUNCTIONAL ASSESSMENT: PAIN_FUNCTIONAL_ASSESSMENT: 0-10

## 2024-06-27 ASSESSMENT — PAIN SCALES - GENERAL: PAINLEVEL_OUTOF10: 0 - NO PAIN

## 2024-06-27 NOTE — PROGRESS NOTES
"Physical Therapy    Physical Therapy Treatment    Patient Name: Tiana Ackerman  MRN: [id number]  Insurance:  Dax[employer]'s Choice PPO through [employer]  20% coins, $350 ded ($350 met), $2000 oop ($640.92 met), no copay, bmn radha yr, no PA   Visit 3 of 20 (re-assess at 10)  Today's Date: 6/27/2024  Time Calculation  Start Time: 1710  Stop Time: 1750  Time Calculation (min): 40 min     PT Therapeutic Procedures Time Entry  Manual Therapy Time Entry: 23  Neuromuscular Re-Education Time Entry: 5  Therapeutic Exercise Time Entry: 12                 Current Problem  1. Muscle tightness        2. Left ankle pain  Follow Up In Physical Therapy        Subjective    General  Pt notes that she does not have calf pain anymore, just the Achilles pain. She notes that she is still refraining from running, and stopped jump roping. She has been keeping up with HEP as instructed.     Precautions  Precautions  Precautions Comment: no falls since last visit  Pain Assessment  Pain Assessment: 0-10  0-10 (Numeric) Pain Score: 0 - No pain  Objective     Treatments:  Ther Ex 74658: 12/1  Elliptical L1 4 min  Gastroc, soleus, and FHL stretching in standing, :30 1x each  Prone press ups, 10x    Neuromusc Re-edu 24669: 5/0  BAPS x4, L2 FWB, 10x each    Manual Ther Tech 16990: 23/2  DTM to sides of Achilles.   TPR to L post tib.  STM and effleurage to full L calf.   IASTM scraping/stroking to the gastroc, musculotendinous junction of gastroc, and sides of Achilles.     Not performed today:  Eccentric heel raises, 10x  Tband hip x4, red, 10x each bilat  C-C walkout x4, 15#, 5x each    OP EDUCATION:     Assessment:   Pt had resolution of symptoms after MTT, though pt was cautioned that relief is often a \"2 steps forward, one step back\" kind of path. Success occurs with decreased duration and decreased frequency of symptoms, as well as return to more functions with less inc in symptoms.     Plan:  If pt cont to progress, add more dynamic balance " activities.      Goals:

## 2024-07-02 ENCOUNTER — TREATMENT (OUTPATIENT)
Dept: PHYSICAL THERAPY | Facility: CLINIC | Age: 59
End: 2024-07-02
Payer: COMMERCIAL

## 2024-07-02 DIAGNOSIS — M62.89 MUSCLE TIGHTNESS: Primary | ICD-10-CM

## 2024-07-02 DIAGNOSIS — M25.572 LEFT ANKLE PAIN: ICD-10-CM

## 2024-07-02 PROCEDURE — 97110 THERAPEUTIC EXERCISES: CPT | Mod: GP | Performed by: PHYSICAL THERAPIST

## 2024-07-02 PROCEDURE — 97140 MANUAL THERAPY 1/> REGIONS: CPT | Mod: GP | Performed by: PHYSICAL THERAPIST

## 2024-07-02 PROCEDURE — 97112 NEUROMUSCULAR REEDUCATION: CPT | Mod: GP | Performed by: PHYSICAL THERAPIST

## 2024-07-02 NOTE — PROGRESS NOTES
Physical Therapy    Physical Therapy Treatment    Patient Name: Tiana Ackerman  MRN: [id number]  Insurance:  [employer]ax[employer]'s Choice PPO through [employer]  20% coins, $350 ded ($350 met), $2000 oop ($640.92 met), no copay, bmn radha yr, no PA   Visit 4 of 20 (re-assess at 10)  Today's Date: 7/10/2024        PT Therapeutic Procedures Time Entry  Manual Therapy Time Entry: 18  Neuromuscular Re-Education Time Entry: 10  Therapeutic Exercise Time Entry: 15                 Current Problem  1. Muscle tightness        2. Left ankle pain  Follow Up In Physical Therapy        Subjective    General  Pt states that she is doing well overall, stating that she felt her best after the MTT last session, and it has pretty much stayed that way. She feels ready to retry light running.     Precautions  Precautions  Precautions Comment: no falls since last visit     Objective     Treatments:  Ther Ex 31620: 15/1  Elliptical L1 4 min  Gastroc, soleus, and FHL stretching in standing, :30 1x each  Walking hamstring and lateral lunge stretches 1 lap each    Neuromusc Re-edu 60362: 10/1  BAPS x4, L2 FWB, 15x each  C-C walkout x4, 15#, 5x each    Manual Ther Tech 82178: 18/1  DTM to sides of Achilles.   Cupping (smallest cup) to the sides of the Achilles tendon, with bouncing and sliding cup manipulation.  Talocrural distraction  Manual calc inv/ever- with overpressure.    OP EDUCATION:     Assessment:  Pt did well with program and progressions that were made today, showing good form and control throughout. Added cupping today to hopefully better address the bogginess in the area on the sides of the L Achilles. She mariaa this reasonably well.      Plan:  If pt cont to progress, add more dynamic balance activities.      Goals:

## 2024-07-10 DIAGNOSIS — M76.62 ACHILLES TENDINITIS OF LEFT LOWER EXTREMITY: ICD-10-CM

## 2024-07-10 DIAGNOSIS — M17.12 PRIMARY OSTEOARTHRITIS OF LEFT KNEE: ICD-10-CM

## 2024-07-11 ENCOUNTER — APPOINTMENT (OUTPATIENT)
Dept: PHYSICAL THERAPY | Facility: CLINIC | Age: 59
End: 2024-07-11
Payer: COMMERCIAL

## 2024-07-18 ENCOUNTER — LAB (OUTPATIENT)
Dept: LAB | Facility: LAB | Age: 59
End: 2024-07-18
Payer: COMMERCIAL

## 2024-07-18 ENCOUNTER — TREATMENT (OUTPATIENT)
Dept: PHYSICAL THERAPY | Facility: CLINIC | Age: 59
End: 2024-07-18
Payer: COMMERCIAL

## 2024-07-18 DIAGNOSIS — E53.8 VITAMIN B12 DEFICIENCY: ICD-10-CM

## 2024-07-18 DIAGNOSIS — D72.819 LEUKOPENIA, UNSPECIFIED TYPE: ICD-10-CM

## 2024-07-18 DIAGNOSIS — Z00.00 ROUTINE ADULT HEALTH MAINTENANCE: ICD-10-CM

## 2024-07-18 DIAGNOSIS — E55.9 VITAMIN D DEFICIENCY: ICD-10-CM

## 2024-07-18 DIAGNOSIS — R73.01 IMPAIRED FASTING GLUCOSE: ICD-10-CM

## 2024-07-18 DIAGNOSIS — M25.572 LEFT ANKLE PAIN: ICD-10-CM

## 2024-07-18 DIAGNOSIS — M62.89 MUSCLE TIGHTNESS: Primary | ICD-10-CM

## 2024-07-18 DIAGNOSIS — E78.5 HYPERLIPIDEMIA, UNSPECIFIED HYPERLIPIDEMIA TYPE: ICD-10-CM

## 2024-07-18 LAB
25(OH)D3 SERPL-MCNC: 37 NG/ML (ref 30–100)
ALBUMIN SERPL BCP-MCNC: 4.2 G/DL (ref 3.4–5)
ALP SERPL-CCNC: 56 U/L (ref 33–136)
ALT SERPL W P-5'-P-CCNC: 16 U/L (ref 7–45)
ANION GAP SERPL CALC-SCNC: 13 MMOL/L (ref 10–20)
APPEARANCE UR: ABNORMAL
AST SERPL W P-5'-P-CCNC: 27 U/L (ref 9–39)
BACTERIA #/AREA URNS AUTO: ABNORMAL /HPF
BASOPHILS # BLD AUTO: 0.04 X10*3/UL (ref 0–0.1)
BASOPHILS NFR BLD AUTO: 1.1 %
BILIRUB SERPL-MCNC: 0.9 MG/DL (ref 0–1.2)
BILIRUB UR STRIP.AUTO-MCNC: NEGATIVE MG/DL
BUN SERPL-MCNC: 12 MG/DL (ref 6–23)
CALCIUM SERPL-MCNC: 9.1 MG/DL (ref 8.6–10.6)
CHLORIDE SERPL-SCNC: 99 MMOL/L (ref 98–107)
CHOLEST SERPL-MCNC: 207 MG/DL (ref 0–199)
CHOLESTEROL/HDL RATIO: 2.8
CO2 SERPL-SCNC: 28 MMOL/L (ref 21–32)
COLOR UR: ABNORMAL
CREAT SERPL-MCNC: 0.8 MG/DL (ref 0.5–1.05)
EGFRCR SERPLBLD CKD-EPI 2021: 84 ML/MIN/1.73M*2
EOSINOPHIL # BLD AUTO: 0.16 X10*3/UL (ref 0–0.7)
EOSINOPHIL NFR BLD AUTO: 4.6 %
ERYTHROCYTE [DISTWIDTH] IN BLOOD BY AUTOMATED COUNT: 12 % (ref 11.5–14.5)
EST. AVERAGE GLUCOSE BLD GHB EST-MCNC: 100 MG/DL
GLUCOSE SERPL-MCNC: 92 MG/DL (ref 74–99)
GLUCOSE UR STRIP.AUTO-MCNC: NORMAL MG/DL
HBA1C MFR BLD: 5.1 %
HCT VFR BLD AUTO: 38.7 % (ref 36–46)
HDLC SERPL-MCNC: 73.7 MG/DL
HGB BLD-MCNC: 13.4 G/DL (ref 12–16)
IMM GRANULOCYTES # BLD AUTO: 0 X10*3/UL (ref 0–0.7)
IMM GRANULOCYTES NFR BLD AUTO: 0 % (ref 0–0.9)
KETONES UR STRIP.AUTO-MCNC: NEGATIVE MG/DL
LDLC SERPL CALC-MCNC: 118 MG/DL
LEUKOCYTE ESTERASE UR QL STRIP.AUTO: ABNORMAL
LYMPHOCYTES # BLD AUTO: 1.3 X10*3/UL (ref 1.2–4.8)
LYMPHOCYTES NFR BLD AUTO: 37 %
MCH RBC QN AUTO: 31.2 PG (ref 26–34)
MCHC RBC AUTO-ENTMCNC: 34.6 G/DL (ref 32–36)
MCV RBC AUTO: 90 FL (ref 80–100)
MONOCYTES # BLD AUTO: 0.39 X10*3/UL (ref 0.1–1)
MONOCYTES NFR BLD AUTO: 11.1 %
MUCOUS THREADS #/AREA URNS AUTO: ABNORMAL /LPF
NEUTROPHILS # BLD AUTO: 1.62 X10*3/UL (ref 1.2–7.7)
NEUTROPHILS NFR BLD AUTO: 46.2 %
NITRITE UR QL STRIP.AUTO: NEGATIVE
NON HDL CHOLESTEROL: 133 MG/DL (ref 0–149)
NRBC BLD-RTO: 0 /100 WBCS (ref 0–0)
PH UR STRIP.AUTO: 6 [PH]
PLATELET # BLD AUTO: 186 X10*3/UL (ref 150–450)
POTASSIUM SERPL-SCNC: 4.7 MMOL/L (ref 3.5–5.3)
PROT SERPL-MCNC: 6.4 G/DL (ref 6.4–8.2)
PROT UR STRIP.AUTO-MCNC: NEGATIVE MG/DL
RBC # BLD AUTO: 4.29 X10*6/UL (ref 4–5.2)
RBC # UR STRIP.AUTO: NEGATIVE /UL
RBC #/AREA URNS AUTO: ABNORMAL /HPF
RENAL EPI CELLS #/AREA UR COMP ASSIST: ABNORMAL /HPF
SODIUM SERPL-SCNC: 135 MMOL/L (ref 136–145)
SP GR UR STRIP.AUTO: 1.01
SQUAMOUS #/AREA URNS AUTO: ABNORMAL /HPF
TRIGL SERPL-MCNC: 77 MG/DL (ref 0–149)
TSH SERPL-ACNC: 2.84 MIU/L (ref 0.44–3.98)
UROBILINOGEN UR STRIP.AUTO-MCNC: NORMAL MG/DL
VIT B12 SERPL-MCNC: 1670 PG/ML (ref 211–911)
VLDL: 15 MG/DL (ref 0–40)
WBC # BLD AUTO: 3.5 X10*3/UL (ref 4.4–11.3)
WBC #/AREA URNS AUTO: ABNORMAL /HPF

## 2024-07-18 PROCEDURE — 82306 VITAMIN D 25 HYDROXY: CPT

## 2024-07-18 PROCEDURE — 81001 URINALYSIS AUTO W/SCOPE: CPT

## 2024-07-18 PROCEDURE — 36415 COLL VENOUS BLD VENIPUNCTURE: CPT

## 2024-07-18 PROCEDURE — 80053 COMPREHEN METABOLIC PANEL: CPT

## 2024-07-18 PROCEDURE — 97110 THERAPEUTIC EXERCISES: CPT | Mod: GP | Performed by: PHYSICAL THERAPIST

## 2024-07-18 PROCEDURE — 83036 HEMOGLOBIN GLYCOSYLATED A1C: CPT

## 2024-07-18 PROCEDURE — 97140 MANUAL THERAPY 1/> REGIONS: CPT | Mod: GP | Performed by: PHYSICAL THERAPIST

## 2024-07-18 PROCEDURE — 85025 COMPLETE CBC W/AUTO DIFF WBC: CPT

## 2024-07-18 PROCEDURE — 80061 LIPID PANEL: CPT

## 2024-07-18 PROCEDURE — 82607 VITAMIN B-12: CPT

## 2024-07-18 PROCEDURE — 84443 ASSAY THYROID STIM HORMONE: CPT

## 2024-07-18 ASSESSMENT — PAIN - FUNCTIONAL ASSESSMENT: PAIN_FUNCTIONAL_ASSESSMENT: 0-10

## 2024-07-18 ASSESSMENT — PAIN SCALES - GENERAL: PAINLEVEL_OUTOF10: 3

## 2024-07-18 NOTE — PROGRESS NOTES
Physical Therapy    Physical Therapy Treatment    Patient Name: Tiana Ackerman  MRN: [id number]  Insurance:  Dax[employer]'s Choice PPO through [employer]  20% coins, $350 ded ($350 met), $2000 oop ($640.92 met), no copay, bmn radha yr, no PA   Visit 5 of 20 (re-assess at 10)  Today's Date: 7/18/2024  Time Calculation  Start Time: 1622  Stop Time: 1716  Time Calculation (min): 54 min     PT Therapeutic Procedures Time Entry  Manual Therapy Time Entry: 40  Therapeutic Exercise Time Entry: 8                 Current Problem  1. Muscle tightness        2. Left ankle pain  Follow Up In Physical Therapy        Subjective    General    Pt states that she is doing well overall, stating that she felt her best after the MTT last session, and it has pretty much stayed that way. She feels ready to retry light running.     Precautions  Precautions  Precautions Comment: no falls since last visit  Pain Assessment  Pain Assessment: 0-10  0-10 (Numeric) Pain Score: 3  Objective     Treatments:  Ther Ex 91091: 8/1  Upright bike, L5 5 min.  Gastroc, soleus, and FHL stretching in standing, :30 1x each    Neuromusc Re-edu 96682: ---    Not performed today:  Walking hamstring and lateral lunge stretches 1 lap each  BAPS x4, L2 FWB, 15x each  C-C walkout x4, 15#, 5x each    Manual Ther Tech 74107: 40/3  IDN: one 40mm needle was placed in the sural nerve H point, no manip    30mm needle was placed in the common fibular nerve point in the lateral popliteal fossa. It was not sunk further after placement due to needle movement that was consistent with pulse, and not wanting to proceed with chance to engage the popliteal artery.     Pt was edu'd on the process of dry needling, as well as how to monitor for light bruising. Pt was also edu'd to maintain good hydration and move as normally as possible, avoiding pain when needed.  Verbal consent obtained for DN procedure after explaining the process.      KT tape applied to the L calf with Y, and transversely  across Achilles with single strip.   Pt was edu'd on tape care and skin mgmt and what signs to look for regarding skin irritation. Pt was edu'd on tape duration and how to safely remove the tape. All questions regarding tape were answered.      Gr 2-3 unilateral and central PA mobs through the lumbar spine    OP EDUCATION:   Added HEP of seated nerve glides and prone press ups to HEP.     Assessment:  Pt reported early in the session that her calf felt more tense and irritated again, but presented with full flexibility in the calf, so muscle tension is likely not the issue.  Pt did have some improvement in tension of the calf after MTT incl needling and prone press ups. Given that the interventions occurred entirely within the spine and nerve paths today, it seems plausible to follow that there is some spine/proximal nerve path involvement to her issues. She was supplied with more spine-focused HEP and instructions to make those exercises more focused than Achilles-specific for now.     Plan:  F/U with pt re: effect of program change.      Goals:

## 2024-07-25 ENCOUNTER — HOSPITAL ENCOUNTER (OUTPATIENT)
Dept: RADIOLOGY | Facility: CLINIC | Age: 59
Discharge: HOME | End: 2024-07-25
Payer: COMMERCIAL

## 2024-07-25 ENCOUNTER — TREATMENT (OUTPATIENT)
Dept: PHYSICAL THERAPY | Facility: CLINIC | Age: 59
End: 2024-07-25
Payer: COMMERCIAL

## 2024-07-25 ENCOUNTER — HOSPITAL ENCOUNTER (OUTPATIENT)
Dept: RADIOLOGY | Facility: CLINIC | Age: 59
End: 2024-07-25
Payer: COMMERCIAL

## 2024-07-25 DIAGNOSIS — M17.12 PRIMARY OSTEOARTHRITIS OF LEFT KNEE: ICD-10-CM

## 2024-07-25 DIAGNOSIS — M76.62 ACHILLES TENDINITIS OF LEFT LOWER EXTREMITY: ICD-10-CM

## 2024-07-25 DIAGNOSIS — M25.572 LEFT ANKLE PAIN: Primary | ICD-10-CM

## 2024-07-25 DIAGNOSIS — M62.89 MUSCLE TIGHTNESS: ICD-10-CM

## 2024-07-25 PROCEDURE — 97110 THERAPEUTIC EXERCISES: CPT | Mod: GP | Performed by: PHYSICAL THERAPIST

## 2024-07-25 PROCEDURE — 97140 MANUAL THERAPY 1/> REGIONS: CPT | Mod: GP | Performed by: PHYSICAL THERAPIST

## 2024-07-25 PROCEDURE — 73721 MRI JNT OF LWR EXTRE W/O DYE: CPT | Mod: LEFT SIDE | Performed by: RADIOLOGY

## 2024-07-25 PROCEDURE — 73721 MRI JNT OF LWR EXTRE W/O DYE: CPT | Mod: LT

## 2024-07-25 ASSESSMENT — PAIN - FUNCTIONAL ASSESSMENT: PAIN_FUNCTIONAL_ASSESSMENT: 0-10

## 2024-07-25 ASSESSMENT — PAIN SCALES - GENERAL: PAINLEVEL_OUTOF10: 2

## 2024-07-25 NOTE — PROGRESS NOTES
Physical Therapy    Physical Therapy Treatment    Patient Name: Tiana Ackerman  MRN: [id number]  Insurance:  [employer] PPO through [employer]  20% coins, $350 ded ($350 met), $2000 oop ($640.92 met), no copay, bmn radha yr, no PA   Visit 6 of 20 (re-assess at 10)  Today's Date: 7/25/2024  Time Calculation  Start Time: 1618  Stop Time: 1710  Time Calculation (min): 52 min     PT Therapeutic Procedures Time Entry  Manual Therapy Time Entry: 30  Therapeutic Exercise Time Entry: 15                 Current Problem  1. Left ankle pain  Follow Up In Physical Therapy      2. Muscle tightness            Subjective    General  Pt states that she is still frustrated with lack of progress with her pain. She notes that she has been getting pain in the medial L knee and Achilles area. She reports that she had L knee MRI, but not for ankle, due to a clerical error on laterality. Ankle MRI will be in 2 weeks.  She has avoided LE workouts and running recently, while keeping back exercises intact. Pt noted that the KT tape felt like it helped.     Precautions  Precautions  Precautions Comment: no falls since last visit  Pain Assessment  Pain Assessment: 0-10  0-10 (Numeric) Pain Score: 2  Objective     Treatments:  Ther Ex 85076: 15/1  Gastroc, soleus, and FHL stretching in standing, :30 1x each  Prone press ups, 10x  Standing LLE midline cross swings for nerve glide, 20x  Spent time reviewing MRI that pt had on her knee, and how that affects her PT plan.     Neuromusc Re-edu 22324: ---    Not performed today:  Walking hamstring and lateral lunge stretches 1 lap each  BAPS x4, L2 FWB, 15x each  C-C walkout x4, 15#, 5x each    Manual Ther Tech 75046: 30/2  DTM to calf  Pool noodle-assisted prone knee flexion    KT tape applied to the L calf with Y, and transversely across Achilles with single strip.     Next visit:  Gr 2-3 unilateral and central PA mobs through the lumbar spine    OP EDUCATION:   Added HEP of seated nerve glides and  "prone press ups to HEP.     Assessment:  Good improvement noted in the \"bogginess\" of the L Achilles area, which may be from the decrease in Achilles strain during exercise, and poss due to KT tape. She had a knot in the lateral calf area, which may be just that, or could be related to nerve strain/compression from a more proximal locale.      Plan:  F/U with pt re: ortho appt next week.      Goals:    "

## 2024-07-29 ENCOUNTER — OFFICE VISIT (OUTPATIENT)
Dept: ORTHOPEDIC SURGERY | Facility: CLINIC | Age: 59
End: 2024-07-29
Payer: COMMERCIAL

## 2024-07-29 DIAGNOSIS — S83.222A PERIPHERAL TEAR OF MEDIAL MENISCUS OF LEFT KNEE AS CURRENT INJURY, INITIAL ENCOUNTER: ICD-10-CM

## 2024-07-29 DIAGNOSIS — M17.12 PRIMARY OSTEOARTHRITIS OF LEFT KNEE: Primary | ICD-10-CM

## 2024-07-29 DIAGNOSIS — M76.62 ACHILLES TENDINITIS OF LEFT LOWER EXTREMITY: ICD-10-CM

## 2024-07-29 PROCEDURE — 99213 OFFICE O/P EST LOW 20 MIN: CPT | Performed by: ORTHOPAEDIC SURGERY

## 2024-07-29 NOTE — PROGRESS NOTES
History: Tiana is here for her left knee and ankle.  She has been doing therapy and feels like it is helping especially with the Achilles.  She still gets some pain on the inside and the back of the knee.  She did get some relief initially with the cortisone.  The meloxicam did not help much.    Past medical history: Multiple  Medications: Multiple  Allergies: No known drug allergies    Please refer to the intake H&P regarding the patient's review of systems, family history and social history as was done today    HEENT: Normal  Lungs: Clear to auscultation  Heart: RRR  Abdomen: Soft, nontender  Skin: clear  Extremity: She has mild tenderness medially over the joint line.  No pain laterally.  There is some fullness and pain posteriorly.  Mild tenderness about 4 cm above the Achilles insertion.  Normal White's test.  No pain to palpation around the Achilles insertion.  She has full ankle range of motion.  She denies any numbness or tingling in the leg.  Contralateral exam is normal for strength, motion, stability and neurovascular assessment.    Radiographs: Previous knee x-rays show moderate medial joint space narrowing.  MRI showed a small radial tear of the posterior horn of the medial meniscus.    Assessment: Moderate left knee DJD with underlying medial meniscus tear  Left Achilles tendinitis    Plan: She does feel like she is making progress especially with the Achilles and her therapy.  She still getting some posterior knee pain and we discussed several options for treatment.  She does have an upcoming trip to [place] planned in a month and would like to consider cortisone before her trip.  All questions were answered today with the patient.    For complete plan and/or surgical details, please refer to Dr. Torres's portion of this split/shared dictation.     Kaylie Navarro PA-C    In a face-to-face encounter today, CHAYITO Torres MD performed a history and physical examination, discussed  pertinent diagnostic studies if indicated, and discussed diagnosis and management strategies with both the patient and the midlevel provider.  I reviewed the midlevel's note and agree with the documented findings and plan of care.  Greater than 50% of the evaluation and treatment decision was performed by the physician myself during today's visit.    Overall she is improving with the Achilles tendinitis and her therapy regimen.  The knee does continue to bother her at times and she has not been doing any running.  She has an upcoming trip to [place] in September and would like to hold off on injections today and do a cortisone injection before her trip.  We discussed that her MRI did show a questionable medial meniscal tear at the anterior rim.  We did consider gel injections, PRP or even a diagnostic block to be at some point if not improving.  We can discuss his options when she returns from her trip and she was given a new note to continue therapy for her ankle and Achilles issue.      This note was generated with voice recognition software and may contain grammatical errors.

## 2024-08-01 ENCOUNTER — TREATMENT (OUTPATIENT)
Dept: PHYSICAL THERAPY | Facility: CLINIC | Age: 59
End: 2024-08-01
Payer: COMMERCIAL

## 2024-08-01 DIAGNOSIS — M25.572 LEFT ANKLE PAIN: Primary | ICD-10-CM

## 2024-08-01 DIAGNOSIS — M62.89 MUSCLE TIGHTNESS: ICD-10-CM

## 2024-08-01 PROCEDURE — 97110 THERAPEUTIC EXERCISES: CPT | Mod: GP

## 2024-08-01 PROCEDURE — 97140 MANUAL THERAPY 1/> REGIONS: CPT | Mod: GP

## 2024-08-01 NOTE — PROGRESS NOTES
Physical Therapy    Physical Therapy Treatment    Patient Name: Tiana Ackerman  MRN: [id number]  Insurance:  [employer] PPO through [employer]  20% coins, $350 ded ($350 met), $2000 oop ($640.92 met), no copay, bmn radha yr, no PA   Visit 7 of 20 (re-assess at 10)  Today's Date: 8/1/2024  Time Calculation  Start Time: 1657  Stop Time: 1736  Time Calculation (min): 39 min     PT Therapeutic Procedures Time Entry  Manual Therapy Time Entry: 14  Therapeutic Exercise Time Entry: 25                 Current Problem  1. Left ankle pain  Follow Up In Physical Therapy      2. Muscle tightness              Subjective    General  Pt notes some improvement in symptoms with KT taping. Plans to see Dr. Torres before traveling to [place] next month. Would like to be placed on hold with PT in the meantime.            Objective     Treatments:  Ther Ex 05771:   Gastroc, soleus, and FHL stretching in standing, :30 1x each  YOVANI 3 min   Prone press ups, 10x  Standing LLE midline cross swings for nerve glide, 20x  Sciatic N. Glide on 6 in step x25  Gastroc/Soleus HR 2x10  Amb on track 1/10 mile with KT taping        Manual Ther Tech 61383:   DTM to calf, medial, lateral hamstring insertion  Pool noodle-assisted prone knee flexion    KT tape applied to the L calf with Y, and transversely across Achilles with single strip.     Next visit:  Gr 2-3 unilateral and central PA mobs through the lumbar spine    OP EDUCATION:   Added HEP of seated nerve glides and prone press ups to HEP.     Assessment:  Improvement of pre-session symptoms. Reviewed current HEP and provided handout and instructions for taping as patient would like to self-tape during travels. Educated on safe tape application and removal. Encouraged continued HEP performance during obscene from PT.        Plan:  On hold with PT while patient travels out of country.

## 2024-08-08 ENCOUNTER — APPOINTMENT (OUTPATIENT)
Dept: RADIOLOGY | Facility: CLINIC | Age: 59
End: 2024-08-08
Payer: COMMERCIAL

## 2024-08-15 ASSESSMENT — PROMIS GLOBAL HEALTH SCALE
RATE_GENERAL_HEALTH: VERY GOOD
RATE_AVERAGE_PAIN: 1
EMOTIONAL_PROBLEMS: SOMETIMES
CARRYOUT_SOCIAL_ACTIVITIES: VERY GOOD
RATE_MENTAL_HEALTH: VERY GOOD
RATE_QUALITY_OF_LIFE: VERY GOOD
RATE_PHYSICAL_HEALTH: VERY GOOD
RATE_AVERAGE_FATIGUE: MILD
RATE_SOCIAL_SATISFACTION: VERY GOOD
CARRYOUT_PHYSICAL_ACTIVITIES: COMPLETELY

## 2024-08-16 ENCOUNTER — APPOINTMENT (OUTPATIENT)
Dept: PRIMARY CARE | Facility: CLINIC | Age: 59
End: 2024-08-16
Payer: COMMERCIAL

## 2024-08-16 VITALS
WEIGHT: 121.8 LBS | OXYGEN SATURATION: 97 % | TEMPERATURE: 97.3 F | HEART RATE: 64 BPM | HEIGHT: 63 IN | BODY MASS INDEX: 21.58 KG/M2 | SYSTOLIC BLOOD PRESSURE: 126 MMHG | DIASTOLIC BLOOD PRESSURE: 81 MMHG

## 2024-08-16 DIAGNOSIS — E53.8 VITAMIN B12 DEFICIENCY: ICD-10-CM

## 2024-08-16 DIAGNOSIS — E55.9 VITAMIN D DEFICIENCY: ICD-10-CM

## 2024-08-16 DIAGNOSIS — F41.9 ANXIETY: ICD-10-CM

## 2024-08-16 DIAGNOSIS — D72.819 LEUKOPENIA, UNSPECIFIED TYPE: ICD-10-CM

## 2024-08-16 DIAGNOSIS — Z12.31 ENCOUNTER FOR SCREENING MAMMOGRAM FOR BREAST CANCER: ICD-10-CM

## 2024-08-16 DIAGNOSIS — Z86.59 H/O ANOREXIA NERVOSA: ICD-10-CM

## 2024-08-16 DIAGNOSIS — Z78.0 MENOPAUSE: ICD-10-CM

## 2024-08-16 DIAGNOSIS — M17.12 UNILATERAL PRIMARY OSTEOARTHRITIS, LEFT KNEE: ICD-10-CM

## 2024-08-16 DIAGNOSIS — Z13.820 SCREENING FOR OSTEOPOROSIS: ICD-10-CM

## 2024-08-16 DIAGNOSIS — Z00.00 ROUTINE ADULT HEALTH MAINTENANCE: Primary | ICD-10-CM

## 2024-08-16 PROBLEM — Z85.828 H/O NONMELANOMA SKIN CANCER: Status: ACTIVE | Noted: 2024-08-16

## 2024-08-16 PROBLEM — M62.89 MUSCLE TIGHTNESS: Status: RESOLVED | Noted: 2024-06-10 | Resolved: 2024-08-16

## 2024-08-16 PROBLEM — M25.572 LEFT ANKLE PAIN: Status: RESOLVED | Noted: 2024-06-10 | Resolved: 2024-08-16

## 2024-08-16 PROCEDURE — 3008F BODY MASS INDEX DOCD: CPT | Performed by: INTERNAL MEDICINE

## 2024-08-16 PROCEDURE — 1036F TOBACCO NON-USER: CPT | Performed by: INTERNAL MEDICINE

## 2024-08-16 PROCEDURE — 99396 PREV VISIT EST AGE 40-64: CPT | Performed by: INTERNAL MEDICINE

## 2024-08-16 RX ORDER — ALPRAZOLAM 0.5 MG/1
0.5 TABLET ORAL 3 TIMES DAILY PRN
Qty: 21 TABLET | Refills: 0 | Status: SHIPPED | OUTPATIENT
Start: 2024-08-16 | End: 2024-08-23

## 2024-08-16 RX ORDER — BUPROPION HYDROCHLORIDE 150 MG/1
150 TABLET ORAL EVERY MORNING
Qty: 30 TABLET | Refills: 1 | Status: SHIPPED | OUTPATIENT
Start: 2024-08-16 | End: 2024-10-15

## 2024-08-16 ASSESSMENT — PATIENT HEALTH QUESTIONNAIRE - PHQ9
2. FEELING DOWN, DEPRESSED OR HOPELESS: NOT AT ALL
1. LITTLE INTEREST OR PLEASURE IN DOING THINGS: NOT AT ALL
SUM OF ALL RESPONSES TO PHQ9 QUESTIONS 1 AND 2: 0

## 2024-08-16 NOTE — ASSESSMENT & PLAN NOTE
Annual Wellness exam completed   Preventive Health History reviewed  Ordered:   Labs    Mammogram   PAP DUE 2025

## 2024-08-16 NOTE — PROGRESS NOTES
ANNUAL CPE VISIT  Chief Complaint   Patient presents with    Annual Exam     Cpe  No concerns     HPI:  Holistic by nature  Having big ups and downs  Dad passed 1.5years ago, super close to him  Caregiver for mom, stress  Anxiety higher  Worrries to an unmanageable level  Uses Xanax prn flying and medical procedures only  Feels depressed at times too  Seeing therapist  Denies bipolar sx    LABS REVIEWED:  Component      Latest Ref Rng 7/18/2024   LEUKOCYTES (10*3/UL) IN BLOOD BY AUTOMATED COUNT, Jordanian      4.4 - 11.3 x10*3/uL 3.5 (L)    nRBC      0.0 - 0.0 /100 WBCs 0.0    ERYTHROCYTES (10*6/UL) IN BLOOD BY AUTOMATED COUNT, Jordanian      4.00 - 5.20 x10*6/uL 4.29    HEMOGLOBIN      12.0 - 16.0 g/dL 13.4    HEMATOCRIT      36.0 - 46.0 % 38.7    MCV      80 - 100 fL 90    MCH      26.0 - 34.0 pg 31.2    MCHC      32.0 - 36.0 g/dL 34.6    RED CELL DISTRIBUTION WIDTH      11.5 - 14.5 % 12.0    PLATELETS (10*3/UL) IN BLOOD AUTOMATED COUNT, Jordanian      150 - 450 x10*3/uL 186    NEUTROPHILS/100 LEUKOCYTES IN BLOOD BY AUTOMATED COUNT, Jordanian      40.0 - 80.0 % 46.2    Immature Granulocytes %, Automated      0.0 - 0.9 % 0.0    Lymphocytes %      13.0 - 44.0 % 37.0    Monocytes %      2.0 - 10.0 % 11.1    Eosinophils %      0.0 - 6.0 % 4.6    Basophils %      0.0 - 2.0 % 1.1    NEUTROPHILS (10*3/UL) IN BLOOD BY AUTOMATED COUNT, Jordanian      1.20 - 7.70 x10*3/uL 1.62    Immature Granulocytes Absolute, Automated      0.00 - 0.70 x10*3/uL 0.00    Lymphocytes Absolute      1.20 - 4.80 x10*3/uL 1.30    Monocytes Absolute      0.10 - 1.00 x10*3/uL 0.39    Eosinophils Absolute      0.00 - 0.70 x10*3/uL 0.16    Basophils Absolute      0.00 - 0.10 x10*3/uL 0.04    GLUCOSE      74 - 99 mg/dL 92    SODIUM      136 - 145 mmol/L 135 (L)    POTASSIUM      3.5 - 5.3 mmol/L 4.7    CHLORIDE      98 - 107 mmol/L 99    Bicarbonate      21 - 32 mmol/L 28    Anion Gap      10 - 20 mmol/L 13    Blood Urea Nitrogen      6 - 23 mg/dL 12     Creatinine      0.50 - 1.05 mg/dL 0.80    EGFR      >60 mL/min/1.73m*2 84    Calcium      8.6 - 10.6 mg/dL 9.1    Albumin      3.4 - 5.0 g/dL 4.2    Alkaline Phosphatase      33 - 136 U/L 56    Total Protein      6.4 - 8.2 g/dL 6.4    AST      9 - 39 U/L 27    Bilirubin Total      0.0 - 1.2 mg/dL 0.9    ALT      7 - 45 U/L 16    Color, Urine      Light-Yellow, Yellow, Dark-Yellow  Light-Yellow    Appearance, Urine      Clear  Turbid ! (N)    Specific Gravity, Urine      1.005 - 1.035  1.008    pH, Urine      5.0, 5.5, 6.0, 6.5, 7.0, 7.5, 8.0  6.0    Protein, Urine      NEGATIVE, 10 (TRACE), 20 (TRACE) mg/dL NEGATIVE    Glucose, Urine      Normal mg/dL Normal    Blood, Urine      NEGATIVE  NEGATIVE    Ketones, Urine      NEGATIVE mg/dL NEGATIVE    Bilirubin, Urine      NEGATIVE  NEGATIVE    Urobilinogen, Urine      Normal mg/dL Normal    Nitrite, Urine      NEGATIVE  NEGATIVE    Leukocyte Esterase, Urine      NEGATIVE  25 [place]/µL !    CHOLESTEROL      0 - 199 mg/dL 207 (H)    HDL CHOLESTEROL      mg/dL 73.7    Cholesterol/HDL Ratio 2.8    LDL Calculated      <=99 mg/dL 118 (H)    VLDL      0 - 40 mg/dL 15    TRIGLYCERIDES      0 - 149 mg/dL 77    Non HDL Cholesterol      0 - 149 mg/dL 133    WBC, Urine      1-5, NONE /HPF 1-5    RBC, Urine      NONE, 1-2, 3-5 /HPF NONE    Squamous Epithelial Cells, Urine      Reference range not established. /HPF 1-9 (SPARSE)    Renal Epithelial Cells, Urine      Reference range not established. /HPF 1-2 (FEW)    Bacteria, Urine      NONE SEEN /HPF 1+ !    Mucus, Urine      Reference range not established. /LPF FEW    Hemoglobin A1C      see below % 5.1    Estimated Average Glucose      Not Established mg/dL 100    Vitamin D, 25-Hydroxy, Total      30 - 100 ng/mL 37    Thyroid Stimulating Hormone      0.44 - 3.98 mIU/L 2.84    Vitamin B12      211 - 911 pg/mL 1,670 (H)               Component  Ref Range & Units 1 yr ago 2 yr ago 3 yr ago 4 yr ago 5 yr ago   WBC  4.4 - 11.3 x10E9/L  3.8 Low  4.3 Low  4.0 Low            Assessment and Plan:  Problem List Items Addressed This Visit          High    Routine adult health maintenance - Primary    Overview     Influenza 9/25/20, 10/1/21, declined further   Shingrix 7/25/22, 10/3/22  Tdap (Age 7+)2/26/2013, 7/25/22  Moderna COVID 19 vaccine 3/13/21, 4/10/21, 12/12/21   Cscope 2018 (4yrs); 7/21/23 (4yrs)  Mamm: 9/18/17; 8/3/20, 10/26/21, 10/25/22  PAP/HPV 8/21/15 (-); 9/24/20 (-)  Hep C Ab (-) 5/4/18  CT CALCIUM SCORE 9/22=0         Current Assessment & Plan     Annual Wellness exam completed   Preventive Health History reviewed  Ordered:   Labs    Mammogram   PAP DUE 2025           Relevant Orders    Comprehensive Metabolic Panel    CBC and Auto Differential    Lipid Panel    Urinalysis with Reflex Culture and Microscopic       Medium    Anxiety    Overview     Buspar not effective  prn Xanax  Sees therapist         Current Assessment & Plan     Will try low dose SSRI or Wellbutrin  She opted for wellbutrin (less weight gain side effect)  If doesn't tolerate would try low dose Zoloft         Relevant Medications    ALPRAZolam (Xanax) 0.5 mg tablet    buPROPion XL (Wellbutrin XL) 150 mg 24 hr tablet    Other Relevant Orders    TSH with reflex to Free T4 if abnormal    Encounter for screening mammogram for breast cancer    Relevant Orders    BI mammo bilateral screening tomosynthesis    H/O anorexia nervosa    Leukopenia    Overview     7/23: 3.8  Likely autoimmune cause  Will monitor         Menopause    Relevant Orders    XR DEXA bone density    Screening for osteoporosis    Relevant Orders    XR DEXA bone density    Unilateral primary osteoarthritis, left knee    Vitamin B12 deficiency    Overview     2019: 233  On oral supplement         Relevant Orders    Vitamin B12    Vitamin D deficiency    Overview     2019: 26  On supplement         Relevant Orders    Vitamin D 25-Hydroxy,Total (for eval of Vitamin D levels)         ROS otherwise negative  "aside from what was mentioned above in HPI.    Vitals  /81   Pulse 64   Temp 36.3 °C (97.3 °F)   Ht 1.594 m (5' 2.75\")   Wt 55.2 kg (121 lb 12.8 oz)   SpO2 97%   BMI 21.75 kg/m²   Body mass index is 21.75 kg/m².  Physical Exam  Gen: Alert, NAD  HEENT:  Normal exam  Neck:  No masses/nodes palpable; Thyroid nontender and without nodules; No DAVID  Respiratory:  Lungs CTAB  CV: RRR  Neuro:  Gross motor and sensory intact  Skin:  No suspicious lesions present  Breast: No masses or axillary lymphadenopathy  Gyn: Normal pelvic exam: no uterine masses or cervical lesions, or CMT; no vaginal D/C. No ovarian or adnexal masses; No external vaginal or anal/perineal lesions (Pt declined chaperone)      Allergies and Medications  Patient has no known allergies.  Current Outpatient Medications   Medication Instructions    ALPRAZolam (XANAX) 0.5 mg, oral, 3 times daily PRN    ascorbic acid (VITAMIN C) 500 mg, oral, Daily RT    buPROPion XL (WELLBUTRIN XL) 150 mg, oral, Every morning, Do not crush, chew, or split.    cholecalciferol (VITAMIN D-3) 50 mcg, oral, Daily    cyanocobalamin (Vitamin B-12) 1,000 mcg tablet 1 tablet, oral, Daily    multivitamin tablet 1 tablet, oral, Daily       Active Problem List  Patient Active Problem List   Diagnosis    Routine adult health maintenance    H/O fracture    Anxiety    Impaired fasting glucose    Irritable bowel syndrome    Unilateral primary osteoarthritis, left knee    Vitamin B12 deficiency    Vitamin D deficiency    Leukopenia    H/O nonmelanoma skin cancer    H/O anorexia nervosa    Encounter for screening mammogram for breast cancer    Screening for osteoporosis    Menopause       Comprehensive Medical/Surgical/Social/Family History  Past Medical History:   Diagnosis Date    H/O abdominal ultrasound     US abd 2012: normal    H/O CT scan     9/22: CT calcium score =0    H/O magnetic resonance imaging 08/2024    MRI knee: Mild osteoarthritic changes in the medial " compartment. There small radial tearing at the free edge of the posterior horn involving the middle to inner 3rd. This appearance is new from 10/01/2018    H/O magnetic resonance imaging of brain and brain stem     : minimal mucosal thickening within a few scattered ethmoid air cells.     Past Surgical History:   Procedure Laterality Date     SECTION, CLASSIC  10/11/2018     Section    COLONOSCOPY  10/16/2018    7/21/23: melanosis coli, diverticulosis sigmpoid colon;  (-) 2018: normal       Social History     Social History Narrative    Social History:    , 3 kids    [facility]    Nonsmoker    Minimal ETOH    ---    Family History:    F: HTN, Lipids, DM,  Colon CA age 80 ( 87)    M:  CAD/CABG age 79  (age 87 in )    B:  DM, addiction problem/cocaine, Stroke age 48 ( age 48)    B:                                                                       Niece - leukemia    MGM: CAD                 MGF:  Coronary Artery Disease                      PGM: Coronary Artery Disease                           PGF:  Coronary Artery Disease

## 2024-08-16 NOTE — ASSESSMENT & PLAN NOTE
Will try low dose SSRI or Wellbutrin  She opted for wellbutrin (less weight gain side effect)  If doesn't tolerate would try low dose Zoloft

## 2024-08-19 DIAGNOSIS — M76.62 ACHILLES TENDINITIS OF LEFT LOWER EXTREMITY: ICD-10-CM

## 2024-08-25 ASSESSMENT — LIFESTYLE VARIABLES
3_OR_MORE_DRINKS_PER_DAY: N
CURRENT_SMOKER: N

## 2024-08-26 ENCOUNTER — HOSPITAL ENCOUNTER (OUTPATIENT)
Dept: RADIOLOGY | Facility: CLINIC | Age: 59
Discharge: HOME | End: 2024-08-26
Payer: COMMERCIAL

## 2024-08-26 ENCOUNTER — OFFICE VISIT (OUTPATIENT)
Dept: ORTHOPEDIC SURGERY | Facility: CLINIC | Age: 59
End: 2024-08-26
Payer: COMMERCIAL

## 2024-08-26 DIAGNOSIS — Z78.0 MENOPAUSE: ICD-10-CM

## 2024-08-26 DIAGNOSIS — Z13.820 SCREENING FOR OSTEOPOROSIS: ICD-10-CM

## 2024-08-26 DIAGNOSIS — M17.12 PRIMARY OSTEOARTHRITIS OF LEFT KNEE: Primary | ICD-10-CM

## 2024-08-26 PROCEDURE — 1036F TOBACCO NON-USER: CPT | Performed by: ORTHOPAEDIC SURGERY

## 2024-08-26 PROCEDURE — 20610 DRAIN/INJ JOINT/BURSA W/O US: CPT | Mod: LT | Performed by: ORTHOPAEDIC SURGERY

## 2024-08-26 PROCEDURE — 77080 DXA BONE DENSITY AXIAL: CPT

## 2024-08-26 PROCEDURE — 2500000005 HC RX 250 GENERAL PHARMACY W/O HCPCS: Performed by: ORTHOPAEDIC SURGERY

## 2024-08-26 PROCEDURE — 2500000004 HC RX 250 GENERAL PHARMACY W/ HCPCS (ALT 636 FOR OP/ED): Performed by: ORTHOPAEDIC SURGERY

## 2024-08-26 PROCEDURE — 99211 OFF/OP EST MAY X REQ PHY/QHP: CPT | Mod: 25 | Performed by: ORTHOPAEDIC SURGERY

## 2024-08-26 RX ORDER — BETAMETHASONE SODIUM PHOSPHATE AND BETAMETHASONE ACETATE 3; 3 MG/ML; MG/ML
12 INJECTION, SUSPENSION INTRA-ARTICULAR; INTRALESIONAL; INTRAMUSCULAR; SOFT TISSUE
Status: COMPLETED | OUTPATIENT
Start: 2024-08-26 | End: 2024-08-26

## 2024-08-26 RX ORDER — LIDOCAINE HYDROCHLORIDE 10 MG/ML
5 INJECTION INFILTRATION; PERINEURAL
Status: COMPLETED | OUTPATIENT
Start: 2024-08-26 | End: 2024-08-26

## 2024-08-26 NOTE — PROGRESS NOTES
Tiana is here for a left knee cortisone injection.  She is going to be leaving for [place] and wanted to be as active as possible.    L Inj/Asp: L knee on 8/26/2024 8:36 AM  Indications: pain  Details: 22 G needle, lateral approach  Medications: 5 mL lidocaine 10 mg/mL (1 %); 12 mg betamethasone acet,sod phos 6 mg/mL  Outcome: tolerated well, no immediate complications  Procedure, treatment alternatives, risks and benefits explained, specific risks discussed. Consent was given by the patient. Immediately prior to procedure a time out was called to verify the correct patient, procedure, equipment, support staff and site/side marked as required. Patient was prepped and draped in the usual sterile fashion.         She will ice and work on range of motion and exercises as directed.    She will follow up  in 1 month to assess progress.  If not improved we did discuss a diagnostic arthroscopy for her small meniscal tear.  She is also obtaining an MRI of her Achilles which appears to be more of a tendinitis issue and we can go through results at that time as well.

## 2024-08-27 PROBLEM — M85.9 DISORDER OF BONE DENSITY AND STRUCTURE, UNSPECIFIED: Status: ACTIVE | Noted: 2024-08-27

## 2024-08-30 ENCOUNTER — TELEPHONE (OUTPATIENT)
Dept: PRIMARY CARE | Facility: CLINIC | Age: 59
End: 2024-08-30
Payer: COMMERCIAL

## 2024-08-30 NOTE — TELEPHONE ENCOUNTER
Yes definitely stop  Contact us when returns and up to her what she wants to do (alternative med vs lower dose of it)

## 2024-08-30 NOTE — TELEPHONE ENCOUNTER
Patient left VM stating she has been on Wellbutrin for 8 days and is having some reactions such as constipation, anxiousness and sleeplessness. Patient is going out of the country and wants to know if she can stop the medication in case the sx get worse while in [place].  Patient stated she will start again when she gets home or may start the Zoloft instead.  Please advise

## 2024-09-09 ENCOUNTER — APPOINTMENT (OUTPATIENT)
Dept: PRIMARY CARE | Facility: CLINIC | Age: 59
End: 2024-09-09
Payer: COMMERCIAL

## 2024-09-12 ENCOUNTER — APPOINTMENT (OUTPATIENT)
Dept: PHYSICAL THERAPY | Facility: CLINIC | Age: 59
End: 2024-09-12
Payer: COMMERCIAL

## 2024-09-16 ENCOUNTER — HOSPITAL ENCOUNTER (OUTPATIENT)
Dept: RADIOLOGY | Facility: CLINIC | Age: 59
Discharge: HOME | End: 2024-09-16
Payer: COMMERCIAL

## 2024-09-16 DIAGNOSIS — Z12.31 ENCOUNTER FOR SCREENING MAMMOGRAM FOR BREAST CANCER: ICD-10-CM

## 2024-09-16 PROCEDURE — 77067 SCR MAMMO BI INCL CAD: CPT

## 2024-09-17 ENCOUNTER — HOSPITAL ENCOUNTER (OUTPATIENT)
Dept: RADIOLOGY | Facility: CLINIC | Age: 59
Discharge: HOME | End: 2024-09-17
Payer: COMMERCIAL

## 2024-09-17 DIAGNOSIS — M76.62 ACHILLES TENDINITIS OF LEFT LOWER EXTREMITY: ICD-10-CM

## 2024-09-17 PROCEDURE — 73721 MRI JNT OF LWR EXTRE W/O DYE: CPT | Mod: LT

## 2024-09-17 PROCEDURE — 73721 MRI JNT OF LWR EXTRE W/O DYE: CPT | Mod: LEFT SIDE | Performed by: RADIOLOGY

## 2024-09-19 ENCOUNTER — APPOINTMENT (OUTPATIENT)
Dept: PHYSICAL THERAPY | Facility: CLINIC | Age: 59
End: 2024-09-19
Payer: COMMERCIAL

## 2024-09-23 ENCOUNTER — OFFICE VISIT (OUTPATIENT)
Dept: ORTHOPEDIC SURGERY | Facility: CLINIC | Age: 59
End: 2024-09-23
Payer: COMMERCIAL

## 2024-09-23 DIAGNOSIS — S83.222A PERIPHERAL TEAR OF MEDIAL MENISCUS OF LEFT KNEE AS CURRENT INJURY, INITIAL ENCOUNTER: Primary | ICD-10-CM

## 2024-09-23 PROCEDURE — 1036F TOBACCO NON-USER: CPT | Performed by: ORTHOPAEDIC SURGERY

## 2024-09-23 PROCEDURE — 99214 OFFICE O/P EST MOD 30 MIN: CPT | Performed by: ORTHOPAEDIC SURGERY

## 2024-09-23 NOTE — PROGRESS NOTES
History: Tiana is here for her left knee.  She did do well with a cortisone injection and was able to get through her trip to [place].  She is getting some recurrent pain again.  There is mainly on the inside of the knee and kneecap.  She takes occasional medicine as well.    Past medical history: Multiple  Medications: Multiple  Allergies: No known drug allergies    Please refer to the intake H&P regarding the patient's review of systems, family history and social history as was done today    HEENT: Normal  Lungs: Clear to auscultation  Heart: RRR  Abdomen: Soft, nontender  Skin: clear  Extremity: She has mild tenderness medially.  1+ crepitus with range of motion.  Negative Lachman and posterior drawer.  Mild laxity with varus stress.  No numbness today.  Contralateral exam is normal for strength, motion, stability and neurovascular assessment.    Radiographs: MRI was again reviewed showing a posterior horn medial meniscal tear.  Lateral meniscus was read as normal although she did have a prior lateral meniscectomy 8 years ago.    Assessment: Recurrent left knee pain with prior partial lateral meniscectomy and more recent medial meniscal tear    Plan: We discussed several options and she has tried injections medicines and other modalities.  She wants to remain very active including running and skiing.  As such she would like to pursue a diagnostic arthroscopy.  We fully discussed that this would not treat any underlying arthritis.  Hopefully this is more of a an acute meniscal issue and she can get back to activities within reason.  We will see her back preoperatively and upon clearance.  I would anticipate 1 month for healing.  All questions were answered today with the patient.    This note was generated with voice recognition software and may contain grammatical errors.

## 2024-09-25 PROBLEM — S83.222A PERIPHERAL TEAR OF MEDIAL MENISCUS, CURRENT INJURY, LEFT KNEE, INITIAL ENCOUNTER: Status: ACTIVE | Noted: 2024-09-24

## 2024-09-26 ENCOUNTER — APPOINTMENT (OUTPATIENT)
Dept: PHYSICAL THERAPY | Facility: CLINIC | Age: 59
End: 2024-09-26
Payer: COMMERCIAL

## 2024-10-03 ENCOUNTER — LAB (OUTPATIENT)
Dept: LAB | Facility: HOSPITAL | Age: 59
End: 2024-10-03
Payer: COMMERCIAL

## 2024-10-03 ENCOUNTER — HOSPITAL ENCOUNTER (OUTPATIENT)
Dept: CARDIOLOGY | Facility: HOSPITAL | Age: 59
Discharge: HOME | End: 2024-10-03
Payer: COMMERCIAL

## 2024-10-03 ENCOUNTER — APPOINTMENT (OUTPATIENT)
Dept: PHYSICAL THERAPY | Facility: CLINIC | Age: 59
End: 2024-10-03
Payer: COMMERCIAL

## 2024-10-03 DIAGNOSIS — S83.222A PERIPHERAL TEAR OF MEDIAL MENISCUS, CURRENT INJURY, LEFT KNEE, INITIAL ENCOUNTER: ICD-10-CM

## 2024-10-03 LAB
ALBUMIN SERPL BCP-MCNC: 4.6 G/DL (ref 3.4–5)
ALP SERPL-CCNC: 68 U/L (ref 33–136)
ALT SERPL W P-5'-P-CCNC: 16 U/L (ref 7–45)
ANION GAP SERPL CALC-SCNC: 12 MMOL/L (ref 10–20)
APPEARANCE UR: CLEAR
AST SERPL W P-5'-P-CCNC: 21 U/L (ref 9–39)
ATRIAL RATE: 62 BPM
BASOPHILS # BLD AUTO: 0.06 X10*3/UL (ref 0–0.1)
BASOPHILS NFR BLD AUTO: 1.4 %
BILIRUB SERPL-MCNC: 0.6 MG/DL (ref 0–1.2)
BILIRUB UR STRIP.AUTO-MCNC: NEGATIVE MG/DL
BUN SERPL-MCNC: 12 MG/DL (ref 6–23)
CALCIUM SERPL-MCNC: 10 MG/DL (ref 8.6–10.3)
CHLORIDE SERPL-SCNC: 102 MMOL/L (ref 98–107)
CO2 SERPL-SCNC: 30 MMOL/L (ref 21–32)
COLOR UR: COLORLESS
CREAT SERPL-MCNC: 0.82 MG/DL (ref 0.5–1.05)
EGFRCR SERPLBLD CKD-EPI 2021: 82 ML/MIN/1.73M*2
EOSINOPHIL # BLD AUTO: 0.07 X10*3/UL (ref 0–0.7)
EOSINOPHIL NFR BLD AUTO: 1.7 %
ERYTHROCYTE [DISTWIDTH] IN BLOOD BY AUTOMATED COUNT: 12.3 % (ref 11.5–14.5)
GLUCOSE SERPL-MCNC: 92 MG/DL (ref 74–99)
GLUCOSE UR STRIP.AUTO-MCNC: NORMAL MG/DL
HCT VFR BLD AUTO: 40.5 % (ref 36–46)
HGB BLD-MCNC: 14.2 G/DL (ref 12–16)
HOLD SPECIMEN: NORMAL
IMM GRANULOCYTES # BLD AUTO: 0.01 X10*3/UL (ref 0–0.7)
IMM GRANULOCYTES NFR BLD AUTO: 0.2 % (ref 0–0.9)
INR PPP: 1.1 (ref 0.9–1.1)
KETONES UR STRIP.AUTO-MCNC: NEGATIVE MG/DL
LEUKOCYTE ESTERASE UR QL STRIP.AUTO: NEGATIVE
LYMPHOCYTES # BLD AUTO: 1.11 X10*3/UL (ref 1.2–4.8)
LYMPHOCYTES NFR BLD AUTO: 26.4 %
MCH RBC QN AUTO: 30.9 PG (ref 26–34)
MCHC RBC AUTO-ENTMCNC: 35.1 G/DL (ref 32–36)
MCV RBC AUTO: 88 FL (ref 80–100)
MONOCYTES # BLD AUTO: 0.39 X10*3/UL (ref 0.1–1)
MONOCYTES NFR BLD AUTO: 9.3 %
NEUTROPHILS # BLD AUTO: 2.57 X10*3/UL (ref 1.2–7.7)
NEUTROPHILS NFR BLD AUTO: 61 %
NITRITE UR QL STRIP.AUTO: NEGATIVE
NRBC BLD-RTO: 0 /100 WBCS (ref 0–0)
P AXIS: 62 DEGREES
P OFFSET: 209 MS
P ONSET: 159 MS
PH UR STRIP.AUTO: 6 [PH]
PLATELET # BLD AUTO: 217 X10*3/UL (ref 150–450)
POTASSIUM SERPL-SCNC: 4.7 MMOL/L (ref 3.5–5.3)
PR INTERVAL: 126 MS
PROT SERPL-MCNC: 7 G/DL (ref 6.4–8.2)
PROT UR STRIP.AUTO-MCNC: NEGATIVE MG/DL
PROTHROMBIN TIME: 12 SECONDS (ref 9.8–12.8)
Q ONSET: 222 MS
QRS COUNT: 10 BEATS
QRS DURATION: 74 MS
QT INTERVAL: 406 MS
QTC CALCULATION(BAZETT): 412 MS
QTC FREDERICIA: 410 MS
R AXIS: 23 DEGREES
RBC # BLD AUTO: 4.6 X10*6/UL (ref 4–5.2)
RBC # UR STRIP.AUTO: NEGATIVE /UL
SODIUM SERPL-SCNC: 139 MMOL/L (ref 136–145)
SP GR UR STRIP.AUTO: 1
T AXIS: 6 DEGREES
T OFFSET: 425 MS
UROBILINOGEN UR STRIP.AUTO-MCNC: NORMAL MG/DL
VENTRICULAR RATE: 62 BPM
WBC # BLD AUTO: 4.2 X10*3/UL (ref 4.4–11.3)

## 2024-10-03 PROCEDURE — 85025 COMPLETE CBC W/AUTO DIFF WBC: CPT

## 2024-10-03 PROCEDURE — 36415 COLL VENOUS BLD VENIPUNCTURE: CPT

## 2024-10-03 PROCEDURE — 93005 ELECTROCARDIOGRAM TRACING: CPT

## 2024-10-03 PROCEDURE — 84075 ASSAY ALKALINE PHOSPHATASE: CPT

## 2024-10-03 PROCEDURE — 85610 PROTHROMBIN TIME: CPT

## 2024-10-03 PROCEDURE — 81003 URINALYSIS AUTO W/O SCOPE: CPT

## 2024-10-14 ENCOUNTER — EVALUATION (OUTPATIENT)
Dept: PHYSICAL THERAPY | Facility: CLINIC | Age: 59
End: 2024-10-14
Payer: COMMERCIAL

## 2024-10-14 ENCOUNTER — OFFICE VISIT (OUTPATIENT)
Dept: ORTHOPEDIC SURGERY | Facility: CLINIC | Age: 59
End: 2024-10-14
Payer: COMMERCIAL

## 2024-10-14 ENCOUNTER — APPOINTMENT (OUTPATIENT)
Dept: PRIMARY CARE | Facility: CLINIC | Age: 59
End: 2024-10-14
Payer: COMMERCIAL

## 2024-10-14 VITALS
HEART RATE: 67 BPM | HEIGHT: 63 IN | OXYGEN SATURATION: 98 % | DIASTOLIC BLOOD PRESSURE: 85 MMHG | WEIGHT: 116.8 LBS | BODY MASS INDEX: 20.7 KG/M2 | SYSTOLIC BLOOD PRESSURE: 122 MMHG | TEMPERATURE: 97.6 F

## 2024-10-14 DIAGNOSIS — F41.9 ANXIETY: ICD-10-CM

## 2024-10-14 DIAGNOSIS — R11.0 NAUSEA: ICD-10-CM

## 2024-10-14 DIAGNOSIS — S83.222A PERIPHERAL TEAR OF MEDIAL MENISCUS, CURRENT INJURY, LEFT KNEE, INITIAL ENCOUNTER: ICD-10-CM

## 2024-10-14 DIAGNOSIS — M25.562 CHRONIC PAIN OF LEFT KNEE: Primary | ICD-10-CM

## 2024-10-14 DIAGNOSIS — G89.29 CHRONIC PAIN OF LEFT KNEE: Primary | ICD-10-CM

## 2024-10-14 DIAGNOSIS — G89.18 POST-OPERATIVE PAIN: Primary | ICD-10-CM

## 2024-10-14 DIAGNOSIS — Z01.818 ENCOUNTER FOR PREOPERATIVE ASSESSMENT: Primary | ICD-10-CM

## 2024-10-14 PROCEDURE — 97161 PT EVAL LOW COMPLEX 20 MIN: CPT | Mod: GP

## 2024-10-14 PROCEDURE — E0114 CRUTCH UNDERARM PAIR NO WOOD: HCPCS | Performed by: PHYSICIAN ASSISTANT

## 2024-10-14 PROCEDURE — 97535 SELF CARE MNGMENT TRAINING: CPT | Mod: GP

## 2024-10-14 PROCEDURE — 99214 OFFICE O/P EST MOD 30 MIN: CPT | Performed by: NURSE PRACTITIONER

## 2024-10-14 PROCEDURE — 1036F TOBACCO NON-USER: CPT | Performed by: NURSE PRACTITIONER

## 2024-10-14 PROCEDURE — 3008F BODY MASS INDEX DOCD: CPT | Performed by: NURSE PRACTITIONER

## 2024-10-14 PROCEDURE — 99211 OFF/OP EST MAY X REQ PHY/QHP: CPT | Performed by: PHYSICIAN ASSISTANT

## 2024-10-14 RX ORDER — ONDANSETRON 4 MG/1
4 TABLET, FILM COATED ORAL EVERY 8 HOURS PRN
Qty: 15 TABLET | Refills: 0 | Status: SHIPPED | OUTPATIENT
Start: 2024-10-14 | End: 2024-10-19

## 2024-10-14 RX ORDER — HYDROCODONE BITARTRATE AND ACETAMINOPHEN 5; 325 MG/1; MG/1
1 TABLET ORAL EVERY 6 HOURS PRN
Qty: 12 TABLET | Refills: 0 | Status: SHIPPED | OUTPATIENT
Start: 2024-10-14 | End: 2024-10-17

## 2024-10-14 RX ORDER — BUPROPION HYDROCHLORIDE 150 MG/1
150 TABLET ORAL EVERY MORNING
Qty: 90 TABLET | Refills: 3 | Status: SHIPPED | OUTPATIENT
Start: 2024-10-14 | End: 2025-10-14

## 2024-10-14 ASSESSMENT — PATIENT HEALTH QUESTIONNAIRE - PHQ9
1. LITTLE INTEREST OR PLEASURE IN DOING THINGS: NOT AT ALL
SUM OF ALL RESPONSES TO PHQ9 QUESTIONS 1 AND 2: 0
2. FEELING DOWN, DEPRESSED OR HOPELESS: NOT AT ALL
1. LITTLE INTEREST OR PLEASURE IN DOING THINGS: NOT AT ALL
2. FEELING DOWN, DEPRESSED OR HOPELESS: NOT AT ALL
SUM OF ALL RESPONSES TO PHQ9 QUESTIONS 1 AND 2: 0

## 2024-10-14 ASSESSMENT — ENCOUNTER SYMPTOMS
DEPRESSION: 0
OCCASIONAL FEELINGS OF UNSTEADINESS: 0
LOSS OF SENSATION IN FEET: 0

## 2024-10-14 NOTE — H&P (VIEW-ONLY)
Pre-Operative Assessment  This consult was requested by the physician below and my evaluation/recommendations will be communicated to the requesting MD by way of the shared electronic medical record, fax, or letter via the [employer].  -----------------------------------------------------------------------------------  Surgeon: Dr Roland Torres   Type of Surgery/Procedure: ARTHROSCOPY/MEDIAL MENISCECTOMY   Patient scheduled for surgery on: 10/22/24  Diagnosis:  Peripheral tear of medial meniscus of left knee as current injury   Planned Anesthesia:  general     HPI:    Skyler   Tiana Ackerman is a 59 y.o. female who presents to the office today for a preoperative consultation at the request of surgeon  who plans on performing  on October 22. This consultation is requested for the specific conditions prompting preoperative evaluation (i.e. because of potential effect on operative risk): see medical history. Planned anesthesia: general. The patient has the following known anesthesia issues:  nausea post anesthesia . Patients bleeding risk: no recent abnormal bleeding, no remote history of abnormal bleeding, and no use of Ca-channel blockers. Patient does not have objections to receiving blood products if needed.    Review of Systems   All other systems reviewed and are negative.      Objective     Vitals:    10/14/24 0933   BP: 122/85   Pulse: 67   Temp: 36.4 °C (97.6 °F)   SpO2: 98%        Physical Exam   Gen: Alert, NAD  HEENT:  PERRLA, EOMI, conjunctiva and sclera normal in appearance. Oral cavity and posterior pharynx without lesions/exudate  Neck:  Supple with FROM; No DAVID  Respiratory:  Lungs CTAB  Cardiovascular:  Heart RRR. No M/R/G.   Neuro:  CN II-XII intact; Gross motor and sensory intact  Skin:  No suspicious lesions present  Psychiatric Assessment: Brief mental examination revealed good judgment and reason, without hallucinations, abnormal affect or abnormal behaviors. Patient is not suicidal or  homicidal.    Predictors of intubation difficulty:   Morbid obesity? no  Body mass index is 21.02 kg/m².   Anatomically abnormal facies? no  No dental implants or appliances    Neck range of motion: normal    Cardiographics  ECG: 10/3/24 scanned to chart  Normal sinus rhythm  Normal ECG  No previous ECGs available  Confirmed by Jacob Rodriguez ([id number]) on 10/3/2024 10:54:12 AM  Echocardiogram: NA     Imaging  Chest x-ray:  NA       7/25/24 Mri L knee:  Mild osteoarthritic changes in the medial compartment. There small  radial tearing at the free edge of the posterior horn involving the  middle to inner 3rd. This appearance is new from 10/01/2018.      No sign of acute ligament disruption.      No acute osseous abnormality.    Lab Review  Below is the patient's most recent value for Albumin, ALT, AST, BUN, Calcium, Chloride, Cholesterol, CO2, Creatinine, GFR, Glucose, HDL, Hematocrit, Hemoglobin, Hemoglobin A1C, LDL, Magnesium, Phosphorus, Platelets, Potassium, PSA, Sodium, Triglycerides, and WBC.   Lab Results   Component Value Date    ALBUMIN 4.6 10/03/2024    ALT 16 10/03/2024    AST 21 10/03/2024    BUN 12 10/03/2024    CALCIUM 10.0 10/03/2024     10/03/2024    CHOL 207 (H) 07/18/2024    CO2 30 10/03/2024    CREATININE 0.82 10/03/2024    HDL 73.7 07/18/2024    HCT 40.5 10/03/2024    HGB 14.2 10/03/2024    HGBA1C 5.1 07/18/2024     10/03/2024    K 4.7 10/03/2024     10/03/2024    TRIG 77 07/18/2024    WBC 4.2 (L) 10/03/2024     Component      Latest Ref Rng 10/3/2024   WBC      4.4 - 11.3 x10*3/uL 4.2 (L)    nRBC      0.0 - 0.0 /100 WBCs 0.0    RBC      4.00 - 5.20 x10*6/uL 4.60    HEMOGLOBIN      12.0 - 16.0 g/dL 14.2    HEMATOCRIT      36.0 - 46.0 % 40.5    MCV      80 - 100 fL 88    MCH      26.0 - 34.0 pg 30.9    MCHC      32.0 - 36.0 g/dL 35.1    RED CELL DISTRIBUTION WIDTH      11.5 - 14.5 % 12.3    Platelets      150 - 450 x10*3/uL 217    Neutrophils %      40.0 - 80.0 % 61.0    Immature  Granulocytes %, Automated      0.0 - 0.9 % 0.2    Lymphocytes %      13.0 - 44.0 % 26.4    Monocytes %      2.0 - 10.0 % 9.3    Eosinophils %      0.0 - 6.0 % 1.7    Basophils %      0.0 - 2.0 % 1.4    Neutrophils Absolute      1.20 - 7.70 x10*3/uL 2.57    Immature Granulocytes Absolute, Automated      0.00 - 0.70 x10*3/uL 0.01    Lymphocytes Absolute      1.20 - 4.80 x10*3/uL 1.11 (L)    Monocytes Absolute      0.10 - 1.00 x10*3/uL 0.39    Eosinophils Absolute      0.00 - 0.70 x10*3/uL 0.07    Basophils Absolute      0.00 - 0.10 x10*3/uL 0.06    GLUCOSE      74 - 99 mg/dL 92    SODIUM      136 - 145 mmol/L 139    POTASSIUM      3.5 - 5.3 mmol/L 4.7    CHLORIDE      98 - 107 mmol/L 102    Bicarbonate      21 - 32 mmol/L 30    Anion Gap      10 - 20 mmol/L 12    Blood Urea Nitrogen      6 - 23 mg/dL 12    Creatinine      0.50 - 1.05 mg/dL 0.82    EGFR      >60 mL/min/1.73m*2 82    Calcium      8.6 - 10.3 mg/dL 10.0    Albumin      3.4 - 5.0 g/dL 4.6    Alkaline Phosphatase      33 - 136 U/L 68    Total Protein      6.4 - 8.2 g/dL 7.0    AST      9 - 39 U/L 21    Bilirubin Total      0.0 - 1.2 mg/dL 0.6    ALT      7 - 45 U/L 16    Color, Urine      Light-Yellow, Yellow, Dark-Yellow  Colorless ! (N)    Appearance, Urine      Clear  Clear    Specific Gravity, Urine      1.005 - 1.035  1.004 ! (N)    pH, Urine      5.0, 5.5, 6.0, 6.5, 7.0, 7.5, 8.0  6.0    Protein, Urine      NEGATIVE, 10 (TRACE), 20 (TRACE) mg/dL NEGATIVE    Glucose, Urine      Normal mg/dL Normal    Blood, Urine      NEGATIVE  NEGATIVE    Ketones, Urine      NEGATIVE mg/dL NEGATIVE    Bilirubin, Urine      NEGATIVE  NEGATIVE    Urobilinogen, Urine      Normal mg/dL Normal    Nitrite, Urine      NEGATIVE  NEGATIVE    Leukocyte Esterase, Urine      NEGATIVE  NEGATIVE    Protime      9.8 - 12.8 seconds 12.0    INR      0.9 - 1.1  1.1    Extra Tube Hold for add-ons.         Note: for a comprehensive list of the patient's lab results, access the Results  Review activity.    Assessment/Plan   59 y.o. female with planned surgery as above.    Known risk factors for perioperative complications: None    Difficulty with intubation is not anticipated.    Current medications which may produce withdrawal symptoms if withheld perioperatively:     1. Preoperative workup as follows ECG, hemoglobin, hematocrit, electrolytes, creatinine, glucose, liver function studies, coagulation studies, urinalysis (urinary tract instrumentation planned).  2. Change in medication regimen before surgery: Stop NSAIDs/ASA/Santiago/Fish Oil 7 days prior to surgery.  3. Prophylaxis for cardiac events with perioperative beta-blockers: should be considered, specific regimen per anesthesia.  4. Invasive hemodynamic monitoring perioperatively: at the discretion of anesthesiologist.  5. Deep vein thrombosis prophylaxis postoperatively:regimen to be chosen by surgical team.  6. Surveillance for postoperative MI with ECG immediately postoperatively and on postoperative days 1 and 2 AND troponin levels 24 hours postoperatively and on day 4 or hospital discharge (whichever comes first): at the discretion of anesthesiologist per surgical protocol  7. Other measures:  last NSAID dose 10/1/3/24;  per surgical protocol     Patient Active Problem List   Diagnosis    Routine adult health maintenance    H/O fracture    Anxiety    Impaired fasting glucose    Irritable bowel syndrome    Unilateral primary osteoarthritis, left knee    Vitamin B12 deficiency    Vitamin D deficiency    Leukopenia    H/O nonmelanoma skin cancer    H/O anorexia nervosa    Encounter for screening mammogram for breast cancer    Screening for osteoporosis    Menopause    Disorder of bone density and structure, unspecified    Peripheral tear of medial meniscus, current injury, left knee, initial encounter    Encounter for preoperative assessment       Past Medical History:   Diagnosis Date    H/O abdominal ultrasound     US abd 2012: normal     H/O bone density study 2024    Lowest  T score -2.1. 10-year Fracture Risk: Major Osteoporotic Fracture  8.1 Hip Fracture                        0.9    H/O CT scan     : CT calcium score =0    H/O magnetic resonance imaging 2024    MRI knee: Mild osteoarthritic changes in the medial compartment. There small radial tearing at the free edge of the posterior horn involving the middle to inner 3rd. This appearance is new from 10/01/2018    H/O magnetic resonance imaging of brain and brain stem     : minimal mucosal thickening within a few scattered ethmoid air cells.     Past Surgical History:   Procedure Laterality Date     SECTION, CLASSIC  10/11/2018     Section    COLONOSCOPY  10/16/2018    7/21/23: melanosis coli, diverticulosis sigmpoid colon;  (-) 2018: normal     Social History     Social History Narrative    Social History:    , 3 kids    [facility]    Nonsmoker    Minimal ETOH    ---    Family History:    F: HTN, Lipids, DM,  Colon CA age 80 ( 87)    M:  CAD/CABG age 79  (age 87 in )    B:  DM, addiction problem/cocaine, Stroke age 48 ( age 48)    B:                                                                       Niece - leukemia    MGM: CAD                 MGF:  Coronary Artery Disease                      PGM: Coronary Artery Disease                           PGF:  Coronary Artery Disease                               Problem List Items Addressed This Visit       Anxiety    Overview     Buspar not effective  prn Xanax  Sees therapist  10/14/24: sx well managed.  no AE or SE.  continue current dose wellbutrin          Relevant Medications    buPROPion XL (Wellbutrin XL) 150 mg 24 hr tablet    Encounter for preoperative assessment - Primary    Current Assessment & Plan     Surgeon: Dr Roland Torres   Type of Surgery/Procedure: ARTHROSCOPY/MEDIAL MENISCECTOMY   Patient scheduled for surgery on: 10/22/24  Diagnosis:  Peripheral tear of medial  meniscus of left knee as current injury   Planned Anesthesia:  general     Patient is cleared for planned procedure          Peripheral tear of medial meniscus, current injury, left knee, initial encounter    Overview     10/22/24 scheduled: ARTHROSCOPY/MEDIAL MENISCECTOMY    7/25/24 Mri L knee:  Mild osteoarthritic changes in the medial compartment. There small  radial tearing at the free edge of the posterior horn involving the  middle to inner 3rd. This appearance is new from 10/01/2018.      No sign of acute ligament disruption.      No acute osseous abnormality.

## 2024-10-14 NOTE — ASSESSMENT & PLAN NOTE
Surgeon: Dr Roland Torres   Type of Surgery/Procedure: ARTHROSCOPY/MEDIAL MENISCECTOMY   Patient scheduled for surgery on: 10/22/24  Diagnosis:  Peripheral tear of medial meniscus of left knee as current injury   Planned Anesthesia:  general     Patient is cleared for planned procedure

## 2024-10-14 NOTE — PROGRESS NOTES
Physical Therapy Evaluation    Patient Name: Tiana Ackerman  MRN: [id number]  PT Evaluation Time Entry  PT Evaluation (Low) Time Entry: 14  PT Therapeutic Procedures Time Entry  Self-Care/Home Mgmt Trainin                 Current Problem  1. Chronic pain of left knee          Insurance    Insurance reviewed   Visit number: 1  [employer] PT OT COPAY 0 DED 0,COVERAGE 80 OOP ([id number]) NO AUTH REQ       Subjective   General:  Pt comes in today with L knee pain for pre op assessment for diagnostic arthroscopy with Dr. Torres. Pt does have confirmed medial meniscus tear which she sustained training for a half marathon in April. She states pain rated 5/10. She states knee is stiff in the morning when she wakes up and inclines do increase pain. PMH: 8 stitches from skin cancer removal  Occupation: Works for [employer]  PLOF: Independent with all activities  Home Environment: House, 1 NAE, 2 stories, lives with     Precautions:    Pain:  REDUCES SYMPTOMS: Motrin - has stopped    PROVOKES SYMPTOMS: walking up inclines    Red Flags: Do you have any of the following? No  Fever/chills, unexplained weight changes, dizziness/fainting, unexplained change in bowel or bladder functions, unexplained malaise or muscle weakness, , numbness or tingling  Does report night sweats    Reviewed medical screening form with pt and medical screening assessed    Imaging:   IMPRESSION: MRI L knee 24  Mild osteoarthritic changes in the medial compartment. There small  radial tearing at the free edge of the posterior horn involving the  middle to inner 3rd. This appearance is new from 10/01/2018.  Objective     GAIT: Antalgic                    ROM  Right knee flexion: 0-110 (limited d/t recent procedure)   Left knee flexion: 0-120P!  Right knee extension: WNL    Left knee extension: lacking 5 deg P!            MMT (R side NT d/t recent procedure)  Right quadriceps: NT    Left quadriceps: 4  Right iliopsoas:  NT    Left iliopsoas: 4+  Right gluteus medius: NT    Left gluteus medius: 4+  Right hip adductors: NT    Left hip adductors: 4+  Right hamstring: NT   Left hamstrin               Treatment: See HEP below    EDUCATION/HEP:  Pt educated on rehab protocol menisectomy vs repair, surgical overview handout, ambulation, negotiating stairs, s/s infection.  Assessment:  PT Pre-Op Assessment  Patient demonstrates independent knowledge and understanding of: anatomy, surgical procedure, post-operative exercise programs, signs and symptoms of post-operative infection and post-operative management of pain.    Patient demonstrates good safety awareness and modified independence with ambulation: gait pattern 3 point gait with BL crutches weight bearing on R, on level surfaces and on stairs    Handouts Given: surgical overview booklet, post-operative exercises, gait training instructions    Plan of Care: Patient will be placed on hold for therapy until after completion of surgical procedure. Upon return to therapy, patient's status will be re-assessed and goal updated    Patient plans for post op [facility] at [facility]      Clinical Presentation: Evolving    Level of Complexity: Low       Plan  Treatment/Interventions: Education/ Instruction, Self care/ home management, Therapeutic exercises, Therapeutic activities  PT Plan: Initial Assessment and Treatment only  PT Frequency: One time visit  Plan of Care Agreement: Patient

## 2024-10-14 NOTE — PROGRESS NOTES
Tiana Ackerman is here today for a pre-op visit of her left knee.  She is scheduled for a partial medial meniscectomy    This is a pre-op visit to discuss the risks and benefits of surgery. The risks and benefits were fully explained to the patient. The patient wishes to proceed.     With any surgery, infection is a risk; usually 1-2%. It can become higher for individuals with diabetes, rheumatoid arthritis, previous surgery, individuals on oral steroids or immunosuppressants, or obese individuals. All of these issues were properly addressed and considered. Reassured all sterile techniques would be followed.  Severe infections may require removal of any prosthesis (if applicable). Cadaver/allograft tissues may be used for certain surgeries.  Although extremely rare, there is a less than 0.5% risk of disease transmission.     The patient will be given preop antibiotics. It was also explained to the patient that there will be some blood loss during the procedure, but that blood transfusion is usually not necessary.  If applicable, it is also noted that a tourniquet may be applied to lower the amount of blood loss during the case.    Pulmonary embolism and blood clots were also discussed with the patient. These can have fatal complications. It is explained to the patient that for certain surgeries the use of SCOOTER hose, foot pumps, incentive spirometers, quick mobility and the use of blood thinners/Aspirin is the standard preventative care.     It was explained that surgery is often used to decrease pain, provide stability, and improve strength but individuals will have varying results postoperatively. There can be variation in motion and a risk of stiffness. It is also stated that occasionally we will have to manipulate an extremity if pain and stiffness persist. We also discussed there are limitations with some motions after certain surgeries.     Fracture, though rare, may also occur intraoperatively. There is  also the possibility of nerve or vascular injuries as well. There is potential for continued numbness or tingling. The benefits of anesthesia were explained to the patient.     All of the patient's questions were answered.     Results/Imaging: Previous MRI showed a posterior horn medial meniscus tear    Assessment: Left knee medial meniscus tear    Plan:   I have personally reviewed the OARRS report for this patient. This report is scanned into the electronic medical record. I have considered the risks of abuse, dependence, addiction, and diversion. The patient's current pain level is 5.  Post operative pain medication was sent to the patient's pharmacy.  The patient will not begin taking this until after surgery.     She will follow up 1 week post op.    This note was generated with voice recognition software and may contain grammatical errors.

## 2024-10-14 NOTE — PROGRESS NOTES
Pre-Operative Assessment  This consult was requested by the physician below and my evaluation/recommendations will be communicated to the requesting MD by way of the shared electronic medical record, fax, or letter via the [employer].  -----------------------------------------------------------------------------------  Surgeon: Dr Roland Torres   Type of Surgery/Procedure: ARTHROSCOPY/MEDIAL MENISCECTOMY   Patient scheduled for surgery on: 10/22/24  Diagnosis:  Peripheral tear of medial meniscus of left knee as current injury   Planned Anesthesia:  general     HPI:    Skyler   Tiana Ackerman is a 59 y.o. female who presents to the office today for a preoperative consultation at the request of surgeon  who plans on performing  on October 22. This consultation is requested for the specific conditions prompting preoperative evaluation (i.e. because of potential effect on operative risk): see medical history. Planned anesthesia: general. The patient has the following known anesthesia issues:  nausea post anesthesia . Patients bleeding risk: no recent abnormal bleeding, no remote history of abnormal bleeding, and no use of Ca-channel blockers. Patient does not have objections to receiving blood products if needed.    Review of Systems   All other systems reviewed and are negative.      Objective     Vitals:    10/14/24 0933   BP: 122/85   Pulse: 67   Temp: 36.4 °C (97.6 °F)   SpO2: 98%        Physical Exam   Gen: Alert, NAD  HEENT:  PERRLA, EOMI, conjunctiva and sclera normal in appearance. Oral cavity and posterior pharynx without lesions/exudate  Neck:  Supple with FROM; No DAVID  Respiratory:  Lungs CTAB  Cardiovascular:  Heart RRR. No M/R/G.   Neuro:  CN II-XII intact; Gross motor and sensory intact  Skin:  No suspicious lesions present  Psychiatric Assessment: Brief mental examination revealed good judgment and reason, without hallucinations, abnormal affect or abnormal behaviors. Patient is not suicidal or  homicidal.    Predictors of intubation difficulty:   Morbid obesity? no  Body mass index is 21.02 kg/m².   Anatomically abnormal facies? no  No dental implants or appliances    Neck range of motion: normal    Cardiographics  ECG: 10/3/24 scanned to chart  Normal sinus rhythm  Normal ECG  No previous ECGs available  Confirmed by Jacob Rodriguez ([id number]) on 10/3/2024 10:54:12 AM  Echocardiogram: NA     Imaging  Chest x-ray:  NA       7/25/24 Mri L knee:  Mild osteoarthritic changes in the medial compartment. There small  radial tearing at the free edge of the posterior horn involving the  middle to inner 3rd. This appearance is new from 10/01/2018.      No sign of acute ligament disruption.      No acute osseous abnormality.    Lab Review  Below is the patient's most recent value for Albumin, ALT, AST, BUN, Calcium, Chloride, Cholesterol, CO2, Creatinine, GFR, Glucose, HDL, Hematocrit, Hemoglobin, Hemoglobin A1C, LDL, Magnesium, Phosphorus, Platelets, Potassium, PSA, Sodium, Triglycerides, and WBC.   Lab Results   Component Value Date    ALBUMIN 4.6 10/03/2024    ALT 16 10/03/2024    AST 21 10/03/2024    BUN 12 10/03/2024    CALCIUM 10.0 10/03/2024     10/03/2024    CHOL 207 (H) 07/18/2024    CO2 30 10/03/2024    CREATININE 0.82 10/03/2024    HDL 73.7 07/18/2024    HCT 40.5 10/03/2024    HGB 14.2 10/03/2024    HGBA1C 5.1 07/18/2024     10/03/2024    K 4.7 10/03/2024     10/03/2024    TRIG 77 07/18/2024    WBC 4.2 (L) 10/03/2024     Component      Latest Ref Rng 10/3/2024   WBC      4.4 - 11.3 x10*3/uL 4.2 (L)    nRBC      0.0 - 0.0 /100 WBCs 0.0    RBC      4.00 - 5.20 x10*6/uL 4.60    HEMOGLOBIN      12.0 - 16.0 g/dL 14.2    HEMATOCRIT      36.0 - 46.0 % 40.5    MCV      80 - 100 fL 88    MCH      26.0 - 34.0 pg 30.9    MCHC      32.0 - 36.0 g/dL 35.1    RED CELL DISTRIBUTION WIDTH      11.5 - 14.5 % 12.3    Platelets      150 - 450 x10*3/uL 217    Neutrophils %      40.0 - 80.0 % 61.0    Immature  Granulocytes %, Automated      0.0 - 0.9 % 0.2    Lymphocytes %      13.0 - 44.0 % 26.4    Monocytes %      2.0 - 10.0 % 9.3    Eosinophils %      0.0 - 6.0 % 1.7    Basophils %      0.0 - 2.0 % 1.4    Neutrophils Absolute      1.20 - 7.70 x10*3/uL 2.57    Immature Granulocytes Absolute, Automated      0.00 - 0.70 x10*3/uL 0.01    Lymphocytes Absolute      1.20 - 4.80 x10*3/uL 1.11 (L)    Monocytes Absolute      0.10 - 1.00 x10*3/uL 0.39    Eosinophils Absolute      0.00 - 0.70 x10*3/uL 0.07    Basophils Absolute      0.00 - 0.10 x10*3/uL 0.06    GLUCOSE      74 - 99 mg/dL 92    SODIUM      136 - 145 mmol/L 139    POTASSIUM      3.5 - 5.3 mmol/L 4.7    CHLORIDE      98 - 107 mmol/L 102    Bicarbonate      21 - 32 mmol/L 30    Anion Gap      10 - 20 mmol/L 12    Blood Urea Nitrogen      6 - 23 mg/dL 12    Creatinine      0.50 - 1.05 mg/dL 0.82    EGFR      >60 mL/min/1.73m*2 82    Calcium      8.6 - 10.3 mg/dL 10.0    Albumin      3.4 - 5.0 g/dL 4.6    Alkaline Phosphatase      33 - 136 U/L 68    Total Protein      6.4 - 8.2 g/dL 7.0    AST      9 - 39 U/L 21    Bilirubin Total      0.0 - 1.2 mg/dL 0.6    ALT      7 - 45 U/L 16    Color, Urine      Light-Yellow, Yellow, Dark-Yellow  Colorless ! (N)    Appearance, Urine      Clear  Clear    Specific Gravity, Urine      1.005 - 1.035  1.004 ! (N)    pH, Urine      5.0, 5.5, 6.0, 6.5, 7.0, 7.5, 8.0  6.0    Protein, Urine      NEGATIVE, 10 (TRACE), 20 (TRACE) mg/dL NEGATIVE    Glucose, Urine      Normal mg/dL Normal    Blood, Urine      NEGATIVE  NEGATIVE    Ketones, Urine      NEGATIVE mg/dL NEGATIVE    Bilirubin, Urine      NEGATIVE  NEGATIVE    Urobilinogen, Urine      Normal mg/dL Normal    Nitrite, Urine      NEGATIVE  NEGATIVE    Leukocyte Esterase, Urine      NEGATIVE  NEGATIVE    Protime      9.8 - 12.8 seconds 12.0    INR      0.9 - 1.1  1.1    Extra Tube Hold for add-ons.         Note: for a comprehensive list of the patient's lab results, access the Results  Review activity.    Assessment/Plan   59 y.o. female with planned surgery as above.    Known risk factors for perioperative complications: None    Difficulty with intubation is not anticipated.    Current medications which may produce withdrawal symptoms if withheld perioperatively:     1. Preoperative workup as follows ECG, hemoglobin, hematocrit, electrolytes, creatinine, glucose, liver function studies, coagulation studies, urinalysis (urinary tract instrumentation planned).  2. Change in medication regimen before surgery: Stop NSAIDs/ASA/Santiago/Fish Oil 7 days prior to surgery.  3. Prophylaxis for cardiac events with perioperative beta-blockers: should be considered, specific regimen per anesthesia.  4. Invasive hemodynamic monitoring perioperatively: at the discretion of anesthesiologist.  5. Deep vein thrombosis prophylaxis postoperatively:regimen to be chosen by surgical team.  6. Surveillance for postoperative MI with ECG immediately postoperatively and on postoperative days 1 and 2 AND troponin levels 24 hours postoperatively and on day 4 or hospital discharge (whichever comes first): at the discretion of anesthesiologist per surgical protocol  7. Other measures:  last NSAID dose 10/1/3/24;  per surgical protocol     Patient Active Problem List   Diagnosis    Routine adult health maintenance    H/O fracture    Anxiety    Impaired fasting glucose    Irritable bowel syndrome    Unilateral primary osteoarthritis, left knee    Vitamin B12 deficiency    Vitamin D deficiency    Leukopenia    H/O nonmelanoma skin cancer    H/O anorexia nervosa    Encounter for screening mammogram for breast cancer    Screening for osteoporosis    Menopause    Disorder of bone density and structure, unspecified    Peripheral tear of medial meniscus, current injury, left knee, initial encounter    Encounter for preoperative assessment       Past Medical History:   Diagnosis Date    H/O abdominal ultrasound     US abd 2012: normal     H/O bone density study 2024    Lowest  T score -2.1. 10-year Fracture Risk: Major Osteoporotic Fracture  8.1 Hip Fracture                        0.9    H/O CT scan     : CT calcium score =0    H/O magnetic resonance imaging 2024    MRI knee: Mild osteoarthritic changes in the medial compartment. There small radial tearing at the free edge of the posterior horn involving the middle to inner 3rd. This appearance is new from 10/01/2018    H/O magnetic resonance imaging of brain and brain stem     : minimal mucosal thickening within a few scattered ethmoid air cells.     Past Surgical History:   Procedure Laterality Date     SECTION, CLASSIC  10/11/2018     Section    COLONOSCOPY  10/16/2018    7/21/23: melanosis coli, diverticulosis sigmpoid colon;  (-) 2018: normal     Social History     Social History Narrative    Social History:    , 3 kids    [facility]    Nonsmoker    Minimal ETOH    ---    Family History:    F: HTN, Lipids, DM,  Colon CA age 80 ( 87)    M:  CAD/CABG age 79  (age 87 in )    B:  DM, addiction problem/cocaine, Stroke age 48 ( age 48)    B:                                                                       Niece - leukemia    MGM: CAD                 MGF:  Coronary Artery Disease                      PGM: Coronary Artery Disease                           PGF:  Coronary Artery Disease                               Problem List Items Addressed This Visit       Anxiety    Overview     Buspar not effective  prn Xanax  Sees therapist  10/14/24: sx well managed.  no AE or SE.  continue current dose wellbutrin          Relevant Medications    buPROPion XL (Wellbutrin XL) 150 mg 24 hr tablet    Encounter for preoperative assessment - Primary    Current Assessment & Plan     Surgeon: Dr Roland Torres   Type of Surgery/Procedure: ARTHROSCOPY/MEDIAL MENISCECTOMY   Patient scheduled for surgery on: 10/22/24  Diagnosis:  Peripheral tear of medial  meniscus of left knee as current injury   Planned Anesthesia:  general     Patient is cleared for planned procedure          Peripheral tear of medial meniscus, current injury, left knee, initial encounter    Overview     10/22/24 scheduled: ARTHROSCOPY/MEDIAL MENISCECTOMY    7/25/24 Mri L knee:  Mild osteoarthritic changes in the medial compartment. There small  radial tearing at the free edge of the posterior horn involving the  middle to inner 3rd. This appearance is new from 10/01/2018.      No sign of acute ligament disruption.      No acute osseous abnormality.

## 2024-10-15 NOTE — PREPROCEDURE INSTRUCTIONS

## 2024-10-21 ENCOUNTER — ANESTHESIA EVENT (OUTPATIENT)
Dept: OPERATING ROOM | Facility: HOSPITAL | Age: 59
End: 2024-10-21
Payer: COMMERCIAL

## 2024-10-21 NOTE — ANESTHESIA PREPROCEDURE EVALUATION
Tiana Ackerman is a 59 y.o. female here for:      ARTHROSCOPY/MEDIAL MENISCECTOMY  With Roland Torres MD  Pre-Op Diagnosis Codes:      * Tear of knee cartilage [S83.222A]    Lab Results   Component Value Date    HGB 14.2 10/03/2024    HCT 40.5 10/03/2024    WBC 4.2 (L) 10/03/2024     10/03/2024     10/03/2024    K 4.7 10/03/2024     10/03/2024    CREATININE 0.82 10/03/2024    BUN 12 10/03/2024       Social History     Substance and Sexual Activity   Drug Use Never      Tobacco Use: Low Risk  (10/22/2024)    Patient History    • Smoking Tobacco Use: Never    • Smokeless Tobacco Use: Never    • Passive Exposure: Not on file      Social History     Substance and Sexual Activity   Alcohol Use Yes    Comment: socially        No Known Allergies    Current Outpatient Medications   Medication Instructions   • ALPRAZolam (XANAX) 0.5 mg, oral, 3 times daily PRN   • ascorbic acid (VITAMIN C) 500 mg, Daily RT   • buPROPion XL (WELLBUTRIN XL) 150 mg, oral, Every morning, Do not crush, chew, or split.   • cholecalciferol (VITAMIN D-3) 50 mcg, Daily   • cyanocobalamin (Vitamin B-12) 1,000 mcg tablet 1 tablet, Daily   • multivitamin tablet 1 tablet, Daily       Past Medical History:   Diagnosis Date   • Anxiety    • Arthritis    • COVID-19     VACCINATED   • Fractures    • H/O abdominal ultrasound     US abd 2012: normal   • H/O anorexia nervosa    • H/O bone density study 08/27/2024    Lowest  T score -2.1. 10-year Fracture Risk: Major Osteoporotic Fracture  8.1 Hip Fracture                        0.9   • H/O CT scan     9/22: CT calcium score =0   • H/O magnetic resonance imaging 08/2024    MRI knee: Mild osteoarthritic changes in the medial compartment. There small radial tearing at the free edge of the posterior horn involving the middle to inner 3rd. This appearance is new from 10/01/2018   • H/O magnetic resonance imaging of brain and brain stem     8/22: minimal mucosal thickening within a few  "scattered ethmoid air cells.   • Irritable bowel syndrome    • Nonmelanoma skin cancer        Past Surgical History:   Procedure Laterality Date   •  SECTION, CLASSIC  10/11/2018     Section   • COLONOSCOPY  10/16/2018    7/21/23: melanosis coli, diverticulosis sigmpoid colon;  (-) 2018: normal       Family History   Problem Relation Name Age of Onset   • No Known Problems Mother          F: HTN, Lipids, DM,  Colon CA age 80 M:  CAD/CABG age 79                                                                       Niece - leukemia MGM: CAD              MGF:  Coronary Artery Disease                   PGM: Coronary Artery Disease       Relevant Problems   Neuro   (+) Anxiety      GI   (+) Irritable bowel syndrome      Musculoskeletal   (+) Unilateral primary osteoarthritis, left knee       Visit Vitals  /87 Comment: right upper arm regular cuff   Pulse 69   Temp 36.7 °C (98.1 °F) (Temporal)   Resp 16   Ht 1.588 m (5' 2.5\")   Wt 51.7 kg (113 lb 15.7 oz)   SpO2 97%   BMI 20.51 kg/m²   OB Status Postmenopausal   Smoking Status Never   BSA 1.51 m²       NPO Details:  NPO/Void Status  Carbohydrate Drink Given Prior to Surgery? : N  Date of Last Liquid: 10/21/24  Time of Last Liquid:   Date of Last Solid: 10/22/24  Time of Last Solid: 183  Last Intake Type: Clear fluids  Time of Last Void: 0600        Physical Exam    Airway  Mallampati: II     Cardiovascular - normal exam     Dental - normal exam     Pulmonary - normal exam     Abdominal - normal exam  Abdomen: soft           Anesthesia Plan    History of general anesthesia?: yes  History of complications of general anesthesia?: no    ASA 2     general     intravenous induction   Anesthetic plan and risks discussed with patient.    Plan discussed with CRNA.        "

## 2024-10-22 ENCOUNTER — ANESTHESIA (OUTPATIENT)
Dept: OPERATING ROOM | Facility: HOSPITAL | Age: 59
End: 2024-10-22
Payer: COMMERCIAL

## 2024-10-22 ENCOUNTER — HOSPITAL ENCOUNTER (OUTPATIENT)
Facility: HOSPITAL | Age: 59
Setting detail: OUTPATIENT SURGERY
Discharge: HOME | End: 2024-10-22
Attending: ORTHOPAEDIC SURGERY | Admitting: ORTHOPAEDIC SURGERY
Payer: COMMERCIAL

## 2024-10-22 VITALS
WEIGHT: 113.98 LBS | HEART RATE: 67 BPM | BODY MASS INDEX: 20.2 KG/M2 | OXYGEN SATURATION: 98 % | HEIGHT: 63 IN | RESPIRATION RATE: 16 BRPM | SYSTOLIC BLOOD PRESSURE: 126 MMHG | TEMPERATURE: 96.8 F | DIASTOLIC BLOOD PRESSURE: 78 MMHG

## 2024-10-22 DIAGNOSIS — S83.222A PERIPHERAL TEAR OF MEDIAL MENISCUS, CURRENT INJURY, LEFT KNEE, INITIAL ENCOUNTER: Primary | ICD-10-CM

## 2024-10-22 PROCEDURE — 7100000010 HC PHASE TWO TIME - EACH INCREMENTAL 1 MINUTE: Performed by: ORTHOPAEDIC SURGERY

## 2024-10-22 PROCEDURE — 3600000009 HC OR TIME - EACH INCREMENTAL 1 MINUTE - PROCEDURE LEVEL FOUR: Performed by: ORTHOPAEDIC SURGERY

## 2024-10-22 PROCEDURE — 2500000004 HC RX 250 GENERAL PHARMACY W/ HCPCS (ALT 636 FOR OP/ED): Performed by: NURSE ANESTHETIST, CERTIFIED REGISTERED

## 2024-10-22 PROCEDURE — 7100000002 HC RECOVERY ROOM TIME - EACH INCREMENTAL 1 MINUTE: Performed by: ORTHOPAEDIC SURGERY

## 2024-10-22 PROCEDURE — 3600000004 HC OR TIME - INITIAL BASE CHARGE - PROCEDURE LEVEL FOUR: Performed by: ORTHOPAEDIC SURGERY

## 2024-10-22 PROCEDURE — 2500000004 HC RX 250 GENERAL PHARMACY W/ HCPCS (ALT 636 FOR OP/ED): Performed by: ORTHOPAEDIC SURGERY

## 2024-10-22 PROCEDURE — 2720000007 HC OR 272 NO HCPCS: Performed by: ORTHOPAEDIC SURGERY

## 2024-10-22 PROCEDURE — 2500000004 HC RX 250 GENERAL PHARMACY W/ HCPCS (ALT 636 FOR OP/ED): Mod: JZ | Performed by: PHYSICIAN ASSISTANT

## 2024-10-22 PROCEDURE — 3700000002 HC GENERAL ANESTHESIA TIME - EACH INCREMENTAL 1 MINUTE: Performed by: ORTHOPAEDIC SURGERY

## 2024-10-22 PROCEDURE — 2500000001 HC RX 250 WO HCPCS SELF ADMINISTERED DRUGS (ALT 637 FOR MEDICARE OP): Performed by: STUDENT IN AN ORGANIZED HEALTH CARE EDUCATION/TRAINING PROGRAM

## 2024-10-22 PROCEDURE — 7100000001 HC RECOVERY ROOM TIME - INITIAL BASE CHARGE: Performed by: ORTHOPAEDIC SURGERY

## 2024-10-22 PROCEDURE — 29881 ARTHRS KNE SRG MNISECTMY M/L: CPT | Performed by: ORTHOPAEDIC SURGERY

## 2024-10-22 PROCEDURE — 7100000009 HC PHASE TWO TIME - INITIAL BASE CHARGE: Performed by: ORTHOPAEDIC SURGERY

## 2024-10-22 PROCEDURE — 2500000005 HC RX 250 GENERAL PHARMACY W/O HCPCS: Performed by: ORTHOPAEDIC SURGERY

## 2024-10-22 PROCEDURE — 3700000001 HC GENERAL ANESTHESIA TIME - INITIAL BASE CHARGE: Performed by: ORTHOPAEDIC SURGERY

## 2024-10-22 RX ORDER — SODIUM CHLORIDE, SODIUM LACTATE, POTASSIUM CHLORIDE, CALCIUM CHLORIDE 600; 310; 30; 20 MG/100ML; MG/100ML; MG/100ML; MG/100ML
20 INJECTION, SOLUTION INTRAVENOUS CONTINUOUS
Status: DISCONTINUED | OUTPATIENT
Start: 2024-10-22 | End: 2024-10-22 | Stop reason: HOSPADM

## 2024-10-22 RX ORDER — LIDOCAINE HYDROCHLORIDE 10 MG/ML
INJECTION, SOLUTION INFILTRATION; PERINEURAL AS NEEDED
Status: DISCONTINUED | OUTPATIENT
Start: 2024-10-22 | End: 2024-10-22 | Stop reason: HOSPADM

## 2024-10-22 RX ORDER — FENTANYL CITRATE 50 UG/ML
25 INJECTION, SOLUTION INTRAMUSCULAR; INTRAVENOUS EVERY 5 MIN PRN
Status: DISCONTINUED | OUTPATIENT
Start: 2024-10-22 | End: 2024-10-22 | Stop reason: HOSPADM

## 2024-10-22 RX ORDER — MIDAZOLAM HYDROCHLORIDE 1 MG/ML
INJECTION, SOLUTION INTRAMUSCULAR; INTRAVENOUS AS NEEDED
Status: DISCONTINUED | OUTPATIENT
Start: 2024-10-22 | End: 2024-10-22

## 2024-10-22 RX ORDER — FENTANYL CITRATE 50 UG/ML
INJECTION, SOLUTION INTRAMUSCULAR; INTRAVENOUS AS NEEDED
Status: DISCONTINUED | OUTPATIENT
Start: 2024-10-22 | End: 2024-10-22

## 2024-10-22 RX ORDER — OXYCODONE HYDROCHLORIDE 5 MG/1
5 TABLET ORAL EVERY 4 HOURS PRN
Status: DISCONTINUED | OUTPATIENT
Start: 2024-10-22 | End: 2024-10-22 | Stop reason: HOSPADM

## 2024-10-22 RX ORDER — LIDOCAINE HYDROCHLORIDE 20 MG/ML
INJECTION, SOLUTION INFILTRATION; PERINEURAL AS NEEDED
Status: DISCONTINUED | OUTPATIENT
Start: 2024-10-22 | End: 2024-10-22

## 2024-10-22 RX ORDER — ONDANSETRON HYDROCHLORIDE 2 MG/ML
4 INJECTION, SOLUTION INTRAVENOUS ONCE AS NEEDED
Status: DISCONTINUED | OUTPATIENT
Start: 2024-10-22 | End: 2024-10-22 | Stop reason: HOSPADM

## 2024-10-22 RX ORDER — LABETALOL HYDROCHLORIDE 5 MG/ML
5 INJECTION, SOLUTION INTRAVENOUS ONCE AS NEEDED
Status: DISCONTINUED | OUTPATIENT
Start: 2024-10-22 | End: 2024-10-22 | Stop reason: HOSPADM

## 2024-10-22 RX ORDER — KETOROLAC TROMETHAMINE 30 MG/ML
INJECTION, SOLUTION INTRAMUSCULAR; INTRAVENOUS AS NEEDED
Status: DISCONTINUED | OUTPATIENT
Start: 2024-10-22 | End: 2024-10-22

## 2024-10-22 RX ORDER — PROPOFOL 10 MG/ML
INJECTION, EMULSION INTRAVENOUS AS NEEDED
Status: DISCONTINUED | OUTPATIENT
Start: 2024-10-22 | End: 2024-10-22

## 2024-10-22 RX ORDER — CEFAZOLIN SODIUM 2 G/100ML
2 INJECTION, SOLUTION INTRAVENOUS ONCE
Status: COMPLETED | OUTPATIENT
Start: 2024-10-22 | End: 2024-10-22

## 2024-10-22 RX ORDER — SODIUM CHLORIDE 0.9 G/100ML
IRRIGANT IRRIGATION AS NEEDED
Status: DISCONTINUED | OUTPATIENT
Start: 2024-10-22 | End: 2024-10-22 | Stop reason: HOSPADM

## 2024-10-22 RX ORDER — ONDANSETRON HYDROCHLORIDE 2 MG/ML
INJECTION, SOLUTION INTRAVENOUS AS NEEDED
Status: DISCONTINUED | OUTPATIENT
Start: 2024-10-22 | End: 2024-10-22

## 2024-10-22 ASSESSMENT — PAIN - FUNCTIONAL ASSESSMENT
PAIN_FUNCTIONAL_ASSESSMENT: 0-10

## 2024-10-22 ASSESSMENT — COLUMBIA-SUICIDE SEVERITY RATING SCALE - C-SSRS
6. HAVE YOU EVER DONE ANYTHING, STARTED TO DO ANYTHING, OR PREPARED TO DO ANYTHING TO END YOUR LIFE?: NO
2. HAVE YOU ACTUALLY HAD ANY THOUGHTS OF KILLING YOURSELF?: NO
1. IN THE PAST MONTH, HAVE YOU WISHED YOU WERE DEAD OR WISHED YOU COULD GO TO SLEEP AND NOT WAKE UP?: NO

## 2024-10-22 ASSESSMENT — PAIN DESCRIPTION - DESCRIPTORS
DESCRIPTORS: SORE
DESCRIPTORS: SORE

## 2024-10-22 ASSESSMENT — PAIN SCALES - GENERAL
PAINLEVEL_OUTOF10: 2
PAINLEVEL_OUTOF10: 0 - NO PAIN
PAINLEVEL_OUTOF10: 0 - NO PAIN
PAIN_LEVEL: 0
PAINLEVEL_OUTOF10: 0 - NO PAIN
PAINLEVEL_OUTOF10: 4

## 2024-10-22 NOTE — DISCHARGE INSTRUCTIONS
Medication given may have significant effects after discharge. Therefore on the day of surgery:  1) you must be accompanied by a responsible adult upon discharge and for 24 hours after surgery.  Do not drive a motor vehicle, operate machinery, power tools or appliance, drink alcoholic beverages, or make critical decisions for 24 hours  2) Be aware of dizziness, which may cause a fall. Change positions slowly.  3) Eating: you may resume your regular diet but it is better to increase intake slowly with mild foods and working up to your regular diet. No greasy, fried or spicy foods today.  4) Nausea/Vomiting: Nausea and vomiting may occur as you become more active or begin to increase food intake. If this should happen, decrease activity and return to liquids. If the problem persists, call your surgeon  5) Pain: Your surgeon may have given you a prescription for pain medication. Take pain medication with food as prescribed. Pain medication may cause constipation, so drink plenty of fluids. If your pain medication does not provide adequate relief, call your surgeon  6) Urinating: Notify your surgeon if you have not urinated within 12 hours after discharge  7) Ice: Apply ice to operative site for 20 min 5-6 times a day or use Polar care as instructed  8) Dressing:   []   Remove dressing in 24hr    [x]  Remove dressing in 48hr   []  Leave open to air after initial dressing is taken off and incision is dry  Do not remove the steri-strips. (no bath/ hot tubs/ pools)   []  Leave dressing in place. Keep dressing/ incision clean and dry    9) Activity    Shoulder/ elbow/Hand   []  Elevate extremity    []  Sling   [] at all times (except for exercises and showering)  [] as needed only for comfort   [] Begin daily motion exercises out of sling as instructed   []  Bend and flex fingers/wrist/elbow frequently   [] other   Knee/ Ankle/ Foot   [] elevate extremity   [x] crutches        [] non-weight bearing to operative extremity   [] partial weight bearing  [x] Full weight bearing as tolerated   []  Use brace whenever walking   [] other      10) Begin physical therapy if advised by you physician:   [] before returning to see you doctor   [x] not until you follow up with your doctor    11) call your doctor at [phone] for an appointment (or follow up as scheduled)    Contact [facility] office if  Increased redness, swelling, drainage of any kind, and/or pain to surgery site.  As well as new onset fevers and or chills.  These could signify an infection.  Calf or thigh tenderness to touch as well as increased swelling or redness.  This could signify a clot formation.  Numbness or tingling to an area around the incision site or below the incision site (toes). Or if the operative extremity becomes cold, blue.  Any rash appears, increased  or new onset nausea/vomiting occur.  This may indicate a reaction to a medication.  Temp is 38.5 C (101F)  12) If you have any concerns or questions, please call [facility] surgeon on call. The 24- hour phone is [phone]  13) If you are unable to contact your surgeon, in an emergency situation, go to the nearest hospital

## 2024-10-22 NOTE — ANESTHESIA PROCEDURE NOTES
Airway  Date/Time: 10/22/2024 7:38 AM  Urgency: elective    Airway not difficult    Staffing  Performed: CRNA   Authorized by: Yariel Corcoran MD    Performed by: ALLAN Rowland-LIBRA  Patient location during procedure: OR    Indications and Patient Condition  Indications for airway management: anesthesia  Spontaneous Ventilation: absent  Sedation level: deep  Preoxygenated: yes  Patient position: sniffing  MILS maintained throughout  Mask difficulty assessment: 0 - not attempted  Planned trial extubation    Final Airway Details  Final airway type: supraglottic airway      Successful airway: classic  Size 4     Number of attempts at approach: 1    Additional Comments  atraumatic

## 2024-10-22 NOTE — OP NOTE
ARTHROSCOPY/MEDIAL MENISCECTOMY (L) Operative Note    Preop diagnosis: Left knee medial meniscal tear, DJD    Postop diagnosis: Same    Procedure:   Left knee arthroscopic partial medial meniscectomy  Left knee arthroscopic chondroplasty of medial femoral condyle and patellofemoral joints    Surgeon: Roland Torres MD  Assistant: Kaylie Navarro PA-C      Findings: see procedure details    EBL: minimal    Procedure Details:   Indications: The patient was noted to have a medial meniscal tear.  The patient also had underlying arthritic change.  The patient had failed multiple conservative measures and elected to proceed with diagnostic arthroscopy and meniscectomy.  The risks and benefits of surgical intervention were noted with the patient and family.  These include infection, stiffness, nerve damage, continued pain as well as need for surgical operations.  Specifically the failure of arthroscopy to treat any of the underlying arthritic condition was noted preoperatively.  Informed consent was obtained prior to arrival in the operating.    Procedure: Upon arrival the patient was verified and was transported from a stretcher to the operative table and placed in a supine position.  An LMA was admitted by the anesthesia staff.  Intravenous antibiotics were given prior to the start of the case.  No tourniquet was used during the procedure.  The left leg was prepped and draped in usual sterile fashion.  A standard inferior lateral arthroscopy portal was established and the arthroscope inserted into the suprasellar space.  The patella had some grade 1 change noted throughout the apex.  There is a area of grade 1 change centrally in the trochlea.  The medial femoral condyle had grade 2 change throughout.  There is grade 1 and 2 change in the medial tibial plateau.  There is a tear to the posterior medial aspect of the medial meniscus.  Under direct visualization an inferior medial portal was established.  The medial  meniscal tear was dissected back to stable tissue using an up-biting forceps and shaver.  Approximately 20% of the medial meniscus was removed.  Final probing showed excellent stability to the remainder the meniscus including the anterior and posterior horn attachment. A gentle chondroplasty was performed medially.  The ACL and PCL were intact.  The posterior compartment was normal.  Lateral femoral condyle and lateral tibial plateau had some mild grade 1 change throughout.. The lateral meniscus was intact with a small deficit from her prior lateral meniscectomy.   Attention was directed back to the patellofemoral joint where a gentle chondroplasty was performed again removing all loose fragments.  The knee was copiously irrigated through the shaver and suctioned dry.  Portal sites closed with 3-0 nylon suture.  All sponge and needle counts are correct prior to closure.  Sterile dressing was applied.  He was awoken from the anesthetic and taken to the recovery room in stable condition.    Kaylie Navarro PA-C was present throughout the entire case. Given the nature of the disease process and the procedure, a skilled surgical first assistant was necessary during the case. The assistant was necessary to hold retractors and to manipulate the extremity during the procedure. A certified scrub tech was at the back table managing the instruments and supplies for the surgical case.    Complications:  None; patient tolerated the procedure well.    Disposition: PACU - hemodynamically stable.  Condition: stable         Roland Torres  Phone Number: [phone]

## 2024-10-22 NOTE — ANESTHESIA POSTPROCEDURE EVALUATION
Patient: Tiana Ackerman    Procedure Summary       Date: 10/22/24 Room / Location: [facility]  / [facility]    Anesthesia Start: 0731 Anesthesia Stop: 0805    Procedure: ARTHROSCOPY/MEDIAL MENISCECTOMY (Left: Knee) Diagnosis:       Peripheral tear of medial meniscus, current injury, left knee, initial encounter      (Peripheral tear of medial meniscus, current injury, left knee, initial encounter [S83.222A])    Surgeons: Roland Torres MD Responsible Provider: Yariel Corcoran MD    Anesthesia Type: general ASA Status: 2            Anesthesia Type: general    Vitals Value Taken Time   /69 10/22/24 0805   Temp 36.3 10/22/24 0805   Pulse 69 10/22/24 0805   Resp 12 10/22/24 0805   SpO2 98 10/22/24 0805       Anesthesia Post Evaluation    Patient location during evaluation: bedside  Patient participation: waiting for patient participation  Level of consciousness: sleepy but conscious  Pain score: 0  Pain management: adequate  Airway patency: patent  Cardiovascular status: acceptable and stable  Respiratory status: acceptable, face mask and oral airway  Hydration status: stable  Postoperative Nausea and Vomiting: none        No notable events documented.

## 2024-10-28 ENCOUNTER — OFFICE VISIT (OUTPATIENT)
Dept: ORTHOPEDIC SURGERY | Facility: CLINIC | Age: 59
End: 2024-10-28
Payer: COMMERCIAL

## 2024-10-28 DIAGNOSIS — S83.222A PERIPHERAL TEAR OF MEDIAL MENISCUS OF LEFT KNEE AS CURRENT INJURY, INITIAL ENCOUNTER: Primary | ICD-10-CM

## 2024-10-28 PROCEDURE — 99211 OFF/OP EST MAY X REQ PHY/QHP: CPT | Performed by: PHYSICIAN ASSISTANT

## 2024-11-14 NOTE — PROGRESS NOTES
History of Present Illness: Tiana Ackerman is here today for a post op visit.  She is 4 weeks out from a partial medial menisectomy. Her knee is doing well. She is working with therapy. She does have some slight tenderness.     Physical Exam: Knee is clear.  No redness irritation.  Minimal swelling and warmth.    Radiographs: None    Assessment: Stable left knee, status post arthroscopic partial medial menisectomy, 4 weeks out.     Plan: Overall her knee is showing signs of improvement. I recommended she wear a compression sleeve as needed. She can begin to do activity as tolerated with low impact exercises and incorporate more activity going forward. I will have her return to work on 12/02/24 with limited squatting and kneeling for the first two weeks then full duty. She can follow-up as needed.    All questions were answered with the patient.       Scribe Attestation  By signing my name below, Zuleika STRATTON Scribe   attest that this documentation has been prepared under the direction and in the presence of Roland Torres MD.

## 2024-11-18 ENCOUNTER — OFFICE VISIT (OUTPATIENT)
Dept: ORTHOPEDIC SURGERY | Facility: CLINIC | Age: 59
End: 2024-11-18
Payer: COMMERCIAL

## 2024-11-18 DIAGNOSIS — S83.222A PERIPHERAL TEAR OF MEDIAL MENISCUS OF LEFT KNEE AS CURRENT INJURY, INITIAL ENCOUNTER: Primary | ICD-10-CM

## 2024-11-18 PROCEDURE — 99211 OFF/OP EST MAY X REQ PHY/QHP: CPT | Performed by: ORTHOPAEDIC SURGERY

## 2024-11-18 NOTE — Clinical Note
November 18, 2024     Patient: Tiana Ackerman   YOB: 1965   Date of Visit: 11/18/2024       To Whom It May Concern:    It is my medical opinion that Tiana Ackerman {Work release (duty restriction):74309}.    If you have any questions or concerns, please don't hesitate to call.         Sincerely,        Roland Torres MD    CC: No Recipients

## 2025-07-07 ENCOUNTER — OFFICE VISIT (OUTPATIENT)
Dept: ORTHOPEDIC SURGERY | Facility: CLINIC | Age: 60
End: 2025-07-07
Payer: COMMERCIAL

## 2025-07-07 ENCOUNTER — HOSPITAL ENCOUNTER (OUTPATIENT)
Dept: RADIOLOGY | Facility: CLINIC | Age: 60
Discharge: HOME | End: 2025-07-07
Payer: COMMERCIAL

## 2025-07-07 DIAGNOSIS — M25.561 RIGHT KNEE PAIN, UNSPECIFIED CHRONICITY: ICD-10-CM

## 2025-07-07 DIAGNOSIS — S83.241A TEAR OF MEDIAL MENISCUS OF RIGHT KNEE, CURRENT, UNSPECIFIED TEAR TYPE, INITIAL ENCOUNTER: ICD-10-CM

## 2025-07-07 DIAGNOSIS — M17.11 PRIMARY OSTEOARTHRITIS OF RIGHT KNEE: Primary | ICD-10-CM

## 2025-07-07 PROCEDURE — 99214 OFFICE O/P EST MOD 30 MIN: CPT | Performed by: ORTHOPAEDIC SURGERY

## 2025-07-07 PROCEDURE — 73564 X-RAY EXAM KNEE 4 OR MORE: CPT | Mod: RIGHT SIDE | Performed by: ORTHOPAEDIC SURGERY

## 2025-07-07 PROCEDURE — 99212 OFFICE O/P EST SF 10 MIN: CPT | Performed by: ORTHOPAEDIC SURGERY

## 2025-07-07 PROCEDURE — 73564 X-RAY EXAM KNEE 4 OR MORE: CPT | Mod: RT

## 2025-07-07 PROCEDURE — 1036F TOBACCO NON-USER: CPT | Performed by: ORTHOPAEDIC SURGERY

## 2025-07-07 RX ORDER — MELOXICAM 15 MG/1
15 TABLET ORAL DAILY
Qty: 30 TABLET | Refills: 2 | Status: SHIPPED | OUTPATIENT
Start: 2025-07-07 | End: 2025-10-05

## 2025-07-07 NOTE — PROGRESS NOTES
History: Tiana is here for her right knee. Her right knee started to bother her over the last 4 weeks. Prior to this her knee was doing fine. She has been working with a nutritionist and stretching. She did notice almost like a tight band around the knee. She laid off working out but it will not go away. She does feel that it is unstable.  She has done very well since her left knee arthroscopy last year.    Past medical history: Multiple  Medications: Multiple  Allergies: No known drug allergies    Please refer to the intake H&P regarding the patient's review of systems, family history and social history as was done today    HEENT: Normal  Lungs: Clear to auscultation  Heart: RRR  Abdomen: Soft, nontender  Skin: clear  Extremity: She has tenderness over the medial side of the right knee today.  1+ crepitus with range of motion.  She has significant pain with Dimple's maneuver more laterally.  Slight fullness posteriorly.  Full range of motion.  No numbness or tingling.  Negative Lachman and posterior drawer.  Contralateral exam is normal for strength, motion, stability and neurovascular assessment.    Radiographs: X-rays right knee show mild arthritic change and medial joint space narrowing.      Assessment: Mild right knee DJD cannot rule out further internal derangement    Plan: Her right knee is now starting to bother her consistently over the last month.  She has pain and mechanical symptoms, and is unable to get back to full activity including exercising.  We discussed her options for treatment and will put in an order for further imaging with a MRI. I refilled her Meloxicam today. She can wear her knee sleeve for compression. She can also do her normal exercise routine but will back down if it becomes painful. She will follow-up after MRI to go through the results. Depending on the results we will consider either surgical intervention or cortisone injections as she does have some upcoming trips.   All  questions were answered today with the patient.    Scribe Attestation:  By signing my name below, I, Chelsi Farias attest that this documentation has been prepared under the direction and in the presence of Roland Torres MD.    Provider Attestation - Scribe documentation:  All medical record entries made by Zuleika Lubin were at my direction and personally dictated by me, Roland Torres MD. I have reviewed the chart and agree that the record is accurate and I confirmed that it reflects my personal performance of the history, physical exam, discussion, and plan.

## 2025-07-18 ENCOUNTER — APPOINTMENT (OUTPATIENT)
Dept: RADIOLOGY | Facility: HOSPITAL | Age: 60
End: 2025-07-18
Payer: COMMERCIAL

## 2025-07-18 DIAGNOSIS — S83.241A TEAR OF MEDIAL MENISCUS OF RIGHT KNEE, CURRENT, UNSPECIFIED TEAR TYPE, INITIAL ENCOUNTER: ICD-10-CM

## 2025-07-18 PROCEDURE — 73721 MRI JNT OF LWR EXTRE W/O DYE: CPT | Mod: RT

## 2025-07-21 ENCOUNTER — OFFICE VISIT (OUTPATIENT)
Dept: ORTHOPEDIC SURGERY | Facility: CLINIC | Age: 60
End: 2025-07-21
Payer: COMMERCIAL

## 2025-07-21 DIAGNOSIS — M17.11 PRIMARY OSTEOARTHRITIS OF RIGHT KNEE: ICD-10-CM

## 2025-07-21 DIAGNOSIS — S83.241A TEAR OF MEDIAL MENISCUS OF RIGHT KNEE, CURRENT, UNSPECIFIED TEAR TYPE, INITIAL ENCOUNTER: Primary | ICD-10-CM

## 2025-07-21 PROCEDURE — 1036F TOBACCO NON-USER: CPT | Performed by: ORTHOPAEDIC SURGERY

## 2025-07-21 PROCEDURE — 99214 OFFICE O/P EST MOD 30 MIN: CPT | Performed by: ORTHOPAEDIC SURGERY

## 2025-07-21 PROCEDURE — 99212 OFFICE O/P EST SF 10 MIN: CPT | Performed by: ORTHOPAEDIC SURGERY

## 2025-07-21 NOTE — PROGRESS NOTES
History: Tiana is here for her right knee. Her knee has improved some since her last visit. She did obtain her MRI.  She has been avoiding running and focusing on a bit more low impact activities.    Past medical history: Multiple  Medications: Multiple  Allergies: No known drug allergies    Please refer to the intake H&P regarding the patient's review of systems, family history and social history as was done today    HEENT: Normal  Lungs: Clear to auscultation  Heart: RRR  Abdomen: Soft, nontender  Skin: clear  Extremity: She has some tenderness medially as well as laterally.  1+ crepitus with range of motion.  Negative Lachman and posterior drawer.  Mild laxity with varus stress.  No numbness or tingling.  Contralateral exam is normal for strength, motion, stability and neurovascular assessment.    Radiographs: Previous x-rays right knee show mild arthritic change and medial joint space narrowing.  MRI of the right knee shows a radial tear of the posterior horn medial meniscus with a focal full-thickness cartilage defect in the medial femoral condyle.     Assessment: Medial meniscus tear and focal cartilage defect with mild right knee DJD     Plan: We again discussed her treatment options given the results of her imaging. She would like to proceed with a right knee arthroscopy medial meniscectomy. She can continue to ice the knee and wear her knee sleeve for compression. She can continue her normal exercise routine and will back down if it becomes painful.  Risks, benefits, and alternatives to surgery were discussed.  These include infection, stiffness, nerve damage, continued pain and deformity as well as need for subsequent operations.  She fully understands that arthroscopy will not treat any of her underlying arthritis.  She may need to modify her activity regimen down the road to try to preserve her knee which she also understands.  We will see her back 1 week postoperatively for wound check and to  start some therapy if needed.  All questions were answered today with the patient.    Scribe Attestation:  By signing my name below, I, Chelsi Farias attest that this documentation has been prepared under the direction and in the presence of Roland Torres MD.    Provider Attestation - Scribe documentation:  All medical record entries made by Zuleika Lubin were at my direction and personally dictated by me, Roland Torres MD. I have reviewed the chart and agree that the record is accurate and I confirmed that it reflects my personal performance of the history, physical exam, discussion, and plan.

## 2025-07-22 DIAGNOSIS — S83.241A OTHER TEAR OF MEDIAL MENISCUS, CURRENT INJURY, RIGHT KNEE, INITIAL ENCOUNTER: ICD-10-CM

## 2025-07-25 ENCOUNTER — HOSPITAL ENCOUNTER (OUTPATIENT)
Dept: CARDIOLOGY | Facility: HOSPITAL | Age: 60
Discharge: HOME | End: 2025-07-25
Payer: COMMERCIAL

## 2025-07-25 ENCOUNTER — APPOINTMENT (OUTPATIENT)
Dept: LAB | Facility: HOSPITAL | Age: 60
End: 2025-07-25
Payer: COMMERCIAL

## 2025-07-25 ENCOUNTER — LAB (OUTPATIENT)
Dept: LAB | Facility: HOSPITAL | Age: 60
End: 2025-07-25
Payer: COMMERCIAL

## 2025-07-25 DIAGNOSIS — S83.241A OTHER TEAR OF MEDIAL MENISCUS, CURRENT INJURY, RIGHT KNEE, INITIAL ENCOUNTER: ICD-10-CM

## 2025-07-25 DIAGNOSIS — S83.241A OTHER TEAR OF MEDIAL MENISCUS, CURRENT INJURY, RIGHT KNEE, INITIAL ENCOUNTER: Primary | ICD-10-CM

## 2025-07-25 LAB
ALBUMIN SERPL BCP-MCNC: 4.1 G/DL (ref 3.4–5)
ALP SERPL-CCNC: 63 U/L (ref 33–136)
ALT SERPL W P-5'-P-CCNC: 18 U/L (ref 7–45)
ANION GAP SERPL CALC-SCNC: 11 MMOL/L (ref 10–20)
APPEARANCE UR: CLEAR
AST SERPL W P-5'-P-CCNC: 28 U/L (ref 9–39)
ATRIAL RATE: 63 BPM
BASOPHILS # BLD AUTO: 0.05 X10*3/UL (ref 0–0.1)
BASOPHILS NFR BLD AUTO: 1.3 %
BILIRUB SERPL-MCNC: 0.7 MG/DL (ref 0–1.2)
BILIRUB UR STRIP.AUTO-MCNC: NEGATIVE MG/DL
BUN SERPL-MCNC: 14 MG/DL (ref 6–23)
CALCIUM SERPL-MCNC: 8.9 MG/DL (ref 8.6–10.3)
CHLORIDE SERPL-SCNC: 98 MMOL/L (ref 98–107)
CO2 SERPL-SCNC: 28 MMOL/L (ref 21–32)
COLOR UR: NORMAL
CREAT SERPL-MCNC: 0.92 MG/DL (ref 0.5–1.05)
EGFRCR SERPLBLD CKD-EPI 2021: 71 ML/MIN/1.73M*2
EOSINOPHIL # BLD AUTO: 0.15 X10*3/UL (ref 0–0.7)
EOSINOPHIL NFR BLD AUTO: 3.8 %
ERYTHROCYTE [DISTWIDTH] IN BLOOD BY AUTOMATED COUNT: 12 % (ref 11.5–14.5)
GLUCOSE SERPL-MCNC: 92 MG/DL (ref 74–99)
GLUCOSE UR STRIP.AUTO-MCNC: NORMAL MG/DL
HCT VFR BLD AUTO: 37.8 % (ref 36–46)
HGB BLD-MCNC: 13.2 G/DL (ref 12–16)
IMM GRANULOCYTES # BLD AUTO: 0.01 X10*3/UL (ref 0–0.7)
IMM GRANULOCYTES NFR BLD AUTO: 0.3 % (ref 0–0.9)
INR PPP: 1.1 (ref 0.9–1.1)
KETONES UR STRIP.AUTO-MCNC: NEGATIVE MG/DL
LEUKOCYTE ESTERASE UR QL STRIP.AUTO: NEGATIVE
LYMPHOCYTES # BLD AUTO: 1.12 X10*3/UL (ref 1.2–4.8)
LYMPHOCYTES NFR BLD AUTO: 28.1 %
MCH RBC QN AUTO: 30.4 PG (ref 26–34)
MCHC RBC AUTO-ENTMCNC: 34.9 G/DL (ref 32–36)
MCV RBC AUTO: 87 FL (ref 80–100)
MONOCYTES # BLD AUTO: 0.4 X10*3/UL (ref 0.1–1)
MONOCYTES NFR BLD AUTO: 10 %
NEUTROPHILS # BLD AUTO: 2.26 X10*3/UL (ref 1.2–7.7)
NEUTROPHILS NFR BLD AUTO: 56.5 %
NITRITE UR QL STRIP.AUTO: NEGATIVE
NRBC BLD-RTO: 0 /100 WBCS (ref 0–0)
P AXIS: 64 DEGREES
P OFFSET: 208 MS
P ONSET: 155 MS
PH UR STRIP.AUTO: 5.5 [PH]
PLATELET # BLD AUTO: 177 X10*3/UL (ref 150–450)
POTASSIUM SERPL-SCNC: 4.8 MMOL/L (ref 3.5–5.3)
PR INTERVAL: 134 MS
PROT SERPL-MCNC: 6.2 G/DL (ref 6.4–8.2)
PROT UR STRIP.AUTO-MCNC: NEGATIVE MG/DL
PROTHROMBIN TIME: 12.4 SECONDS (ref 9.8–12.4)
Q ONSET: 222 MS
QRS COUNT: 10 BEATS
QRS DURATION: 76 MS
QT INTERVAL: 402 MS
QTC CALCULATION(BAZETT): 411 MS
QTC FREDERICIA: 408 MS
R AXIS: 18 DEGREES
RBC # BLD AUTO: 4.34 X10*6/UL (ref 4–5.2)
RBC # UR STRIP.AUTO: NEGATIVE MG/DL
SODIUM SERPL-SCNC: 132 MMOL/L (ref 136–145)
SP GR UR STRIP.AUTO: 1.01
T AXIS: 20 DEGREES
T OFFSET: 423 MS
UROBILINOGEN UR STRIP.AUTO-MCNC: NORMAL MG/DL
VENTRICULAR RATE: 63 BPM
WBC # BLD AUTO: 4 X10*3/UL (ref 4.4–11.3)

## 2025-07-25 PROCEDURE — 81003 URINALYSIS AUTO W/O SCOPE: CPT

## 2025-07-25 PROCEDURE — 80053 COMPREHEN METABOLIC PANEL: CPT

## 2025-07-25 PROCEDURE — 93005 ELECTROCARDIOGRAM TRACING: CPT

## 2025-07-25 PROCEDURE — 85025 COMPLETE CBC W/AUTO DIFF WBC: CPT

## 2025-07-25 PROCEDURE — 36415 COLL VENOUS BLD VENIPUNCTURE: CPT

## 2025-07-25 PROCEDURE — 85610 PROTHROMBIN TIME: CPT

## 2025-07-28 LAB — HOLD SPECIMEN: NORMAL

## 2025-08-01 DIAGNOSIS — G89.18 POST-OPERATIVE PAIN: Primary | ICD-10-CM

## 2025-08-01 RX ORDER — ONDANSETRON 4 MG/1
4 TABLET, FILM COATED ORAL EVERY 8 HOURS PRN
Qty: 20 TABLET | Refills: 0 | Status: SHIPPED | OUTPATIENT
Start: 2025-08-01 | End: 2025-08-08

## 2025-08-01 RX ORDER — HYDROCODONE BITARTRATE AND ACETAMINOPHEN 5; 325 MG/1; MG/1
1 TABLET ORAL EVERY 6 HOURS PRN
Qty: 12 TABLET | Refills: 0 | Status: SHIPPED | OUTPATIENT
Start: 2025-08-01 | End: 2025-08-04

## 2025-08-11 ENCOUNTER — ANESTHESIA EVENT (OUTPATIENT)
Dept: OPERATING ROOM | Facility: HOSPITAL | Age: 60
End: 2025-08-11
Payer: COMMERCIAL

## 2025-08-12 ENCOUNTER — HOSPITAL ENCOUNTER (OUTPATIENT)
Facility: HOSPITAL | Age: 60
Setting detail: OUTPATIENT SURGERY
Discharge: HOME | End: 2025-08-12
Attending: ORTHOPAEDIC SURGERY | Admitting: ORTHOPAEDIC SURGERY
Payer: COMMERCIAL

## 2025-08-12 ENCOUNTER — ANESTHESIA (OUTPATIENT)
Dept: OPERATING ROOM | Facility: HOSPITAL | Age: 60
End: 2025-08-12
Payer: COMMERCIAL

## 2025-08-12 VITALS
SYSTOLIC BLOOD PRESSURE: 122 MMHG | RESPIRATION RATE: 18 BRPM | BODY MASS INDEX: 20.97 KG/M2 | HEIGHT: 62 IN | TEMPERATURE: 97.3 F | OXYGEN SATURATION: 98 % | WEIGHT: 113.98 LBS | HEART RATE: 54 BPM | DIASTOLIC BLOOD PRESSURE: 66 MMHG

## 2025-08-12 DIAGNOSIS — S83.241A OTHER TEAR OF MEDIAL MENISCUS, CURRENT INJURY, RIGHT KNEE, INITIAL ENCOUNTER: Primary | ICD-10-CM

## 2025-08-12 PROCEDURE — 7100000010 HC PHASE TWO TIME - EACH INCREMENTAL 1 MINUTE: Performed by: ORTHOPAEDIC SURGERY

## 2025-08-12 PROCEDURE — 2500000004 HC RX 250 GENERAL PHARMACY W/ HCPCS (ALT 636 FOR OP/ED): Performed by: NURSE ANESTHETIST, CERTIFIED REGISTERED

## 2025-08-12 PROCEDURE — 29881 ARTHRS KNE SRG MNISECTMY M/L: CPT | Performed by: ORTHOPAEDIC SURGERY

## 2025-08-12 PROCEDURE — 7100000001 HC RECOVERY ROOM TIME - INITIAL BASE CHARGE: Performed by: ORTHOPAEDIC SURGERY

## 2025-08-12 PROCEDURE — 3600000004 HC OR TIME - INITIAL BASE CHARGE - PROCEDURE LEVEL FOUR: Performed by: ORTHOPAEDIC SURGERY

## 2025-08-12 PROCEDURE — 2500000004 HC RX 250 GENERAL PHARMACY W/ HCPCS (ALT 636 FOR OP/ED): Performed by: ORTHOPAEDIC SURGERY

## 2025-08-12 PROCEDURE — 7100000002 HC RECOVERY ROOM TIME - EACH INCREMENTAL 1 MINUTE: Performed by: ORTHOPAEDIC SURGERY

## 2025-08-12 PROCEDURE — 2500000004 HC RX 250 GENERAL PHARMACY W/ HCPCS (ALT 636 FOR OP/ED): Performed by: STUDENT IN AN ORGANIZED HEALTH CARE EDUCATION/TRAINING PROGRAM

## 2025-08-12 PROCEDURE — 7100000009 HC PHASE TWO TIME - INITIAL BASE CHARGE: Performed by: ORTHOPAEDIC SURGERY

## 2025-08-12 PROCEDURE — 3600000009 HC OR TIME - EACH INCREMENTAL 1 MINUTE - PROCEDURE LEVEL FOUR: Performed by: ORTHOPAEDIC SURGERY

## 2025-08-12 PROCEDURE — 3700000002 HC GENERAL ANESTHESIA TIME - EACH INCREMENTAL 1 MINUTE: Performed by: ORTHOPAEDIC SURGERY

## 2025-08-12 PROCEDURE — 2720000007 HC OR 272 NO HCPCS: Performed by: ORTHOPAEDIC SURGERY

## 2025-08-12 PROCEDURE — 2500000005 HC RX 250 GENERAL PHARMACY W/O HCPCS: Performed by: ORTHOPAEDIC SURGERY

## 2025-08-12 PROCEDURE — 2500000004 HC RX 250 GENERAL PHARMACY W/ HCPCS (ALT 636 FOR OP/ED): Performed by: PHYSICIAN ASSISTANT

## 2025-08-12 PROCEDURE — 3700000001 HC GENERAL ANESTHESIA TIME - INITIAL BASE CHARGE: Performed by: ORTHOPAEDIC SURGERY

## 2025-08-12 RX ORDER — DIPHENHYDRAMINE HYDROCHLORIDE 50 MG/ML
INJECTION, SOLUTION INTRAMUSCULAR; INTRAVENOUS AS NEEDED
Status: DISCONTINUED | OUTPATIENT
Start: 2025-08-12 | End: 2025-08-12

## 2025-08-12 RX ORDER — FAMOTIDINE 10 MG/ML
INJECTION INTRAVENOUS AS NEEDED
Status: DISCONTINUED | OUTPATIENT
Start: 2025-08-12 | End: 2025-08-12

## 2025-08-12 RX ORDER — SODIUM CHLORIDE 0.9 G/100ML
INJECTION, SOLUTION IRRIGATION AS NEEDED
Status: DISCONTINUED | OUTPATIENT
Start: 2025-08-12 | End: 2025-08-12 | Stop reason: HOSPADM

## 2025-08-12 RX ORDER — LABETALOL HYDROCHLORIDE 5 MG/ML
5 INJECTION, SOLUTION INTRAVENOUS ONCE AS NEEDED
Status: DISCONTINUED | OUTPATIENT
Start: 2025-08-12 | End: 2025-08-12 | Stop reason: HOSPADM

## 2025-08-12 RX ORDER — SODIUM CHLORIDE, SODIUM LACTATE, POTASSIUM CHLORIDE, CALCIUM CHLORIDE 600; 310; 30; 20 MG/100ML; MG/100ML; MG/100ML; MG/100ML
100 INJECTION, SOLUTION INTRAVENOUS CONTINUOUS
Status: DISCONTINUED | OUTPATIENT
Start: 2025-08-12 | End: 2025-08-12 | Stop reason: HOSPADM

## 2025-08-12 RX ORDER — MEPERIDINE HYDROCHLORIDE 25 MG/ML
12.5 INJECTION INTRAMUSCULAR; INTRAVENOUS; SUBCUTANEOUS EVERY 10 MIN PRN
Status: DISCONTINUED | OUTPATIENT
Start: 2025-08-12 | End: 2025-08-12 | Stop reason: HOSPADM

## 2025-08-12 RX ORDER — SCOPOLAMINE 1 MG/3D
PATCH, EXTENDED RELEASE TRANSDERMAL AS NEEDED
Status: DISCONTINUED | OUTPATIENT
Start: 2025-08-12 | End: 2025-08-12

## 2025-08-12 RX ORDER — MIDAZOLAM HYDROCHLORIDE 1 MG/ML
INJECTION, SOLUTION INTRAMUSCULAR; INTRAVENOUS AS NEEDED
Status: DISCONTINUED | OUTPATIENT
Start: 2025-08-12 | End: 2025-08-12

## 2025-08-12 RX ORDER — HYDRALAZINE HYDROCHLORIDE 20 MG/ML
5 INJECTION INTRAMUSCULAR; INTRAVENOUS EVERY 30 MIN PRN
Status: DISCONTINUED | OUTPATIENT
Start: 2025-08-12 | End: 2025-08-12 | Stop reason: HOSPADM

## 2025-08-12 RX ORDER — FENTANYL CITRATE 50 UG/ML
INJECTION, SOLUTION INTRAMUSCULAR; INTRAVENOUS AS NEEDED
Status: DISCONTINUED | OUTPATIENT
Start: 2025-08-12 | End: 2025-08-12

## 2025-08-12 RX ORDER — LIDOCAINE HYDROCHLORIDE 10 MG/ML
INJECTION, SOLUTION INFILTRATION; PERINEURAL AS NEEDED
Status: DISCONTINUED | OUTPATIENT
Start: 2025-08-12 | End: 2025-08-12 | Stop reason: HOSPADM

## 2025-08-12 RX ORDER — DIPHENHYDRAMINE HYDROCHLORIDE 50 MG/ML
12.5 INJECTION, SOLUTION INTRAMUSCULAR; INTRAVENOUS ONCE AS NEEDED
Status: DISCONTINUED | OUTPATIENT
Start: 2025-08-12 | End: 2025-08-12 | Stop reason: HOSPADM

## 2025-08-12 RX ORDER — FENTANYL CITRATE 50 UG/ML
25 INJECTION, SOLUTION INTRAMUSCULAR; INTRAVENOUS EVERY 5 MIN PRN
Refills: 0 | Status: DISCONTINUED | OUTPATIENT
Start: 2025-08-12 | End: 2025-08-12 | Stop reason: HOSPADM

## 2025-08-12 RX ORDER — OXYCODONE HYDROCHLORIDE 5 MG/1
10 TABLET ORAL EVERY 4 HOURS PRN
Refills: 0 | Status: DISCONTINUED | OUTPATIENT
Start: 2025-08-12 | End: 2025-08-12 | Stop reason: HOSPADM

## 2025-08-12 RX ORDER — ALBUTEROL SULFATE 0.83 MG/ML
2.5 SOLUTION RESPIRATORY (INHALATION) ONCE AS NEEDED
Status: DISCONTINUED | OUTPATIENT
Start: 2025-08-12 | End: 2025-08-12 | Stop reason: HOSPADM

## 2025-08-12 RX ORDER — ONDANSETRON HYDROCHLORIDE 2 MG/ML
4 INJECTION, SOLUTION INTRAVENOUS ONCE AS NEEDED
Status: DISCONTINUED | OUTPATIENT
Start: 2025-08-12 | End: 2025-08-12 | Stop reason: HOSPADM

## 2025-08-12 RX ORDER — SODIUM CHLORIDE, SODIUM LACTATE, POTASSIUM CHLORIDE, CALCIUM CHLORIDE 600; 310; 30; 20 MG/100ML; MG/100ML; MG/100ML; MG/100ML
50 INJECTION, SOLUTION INTRAVENOUS CONTINUOUS
Status: DISCONTINUED | OUTPATIENT
Start: 2025-08-12 | End: 2025-08-12 | Stop reason: HOSPADM

## 2025-08-12 RX ORDER — OXYCODONE HYDROCHLORIDE 5 MG/1
5 TABLET ORAL EVERY 4 HOURS PRN
Refills: 0 | Status: DISCONTINUED | OUTPATIENT
Start: 2025-08-12 | End: 2025-08-12 | Stop reason: HOSPADM

## 2025-08-12 RX ORDER — CEFAZOLIN SODIUM 2 G/50ML
2 SOLUTION INTRAVENOUS ONCE
Status: COMPLETED | OUTPATIENT
Start: 2025-08-12 | End: 2025-08-12

## 2025-08-12 RX ORDER — ONDANSETRON HYDROCHLORIDE 2 MG/ML
INJECTION, SOLUTION INTRAVENOUS AS NEEDED
Status: DISCONTINUED | OUTPATIENT
Start: 2025-08-12 | End: 2025-08-12

## 2025-08-12 RX ORDER — KETOROLAC TROMETHAMINE 30 MG/ML
INJECTION, SOLUTION INTRAMUSCULAR; INTRAVENOUS AS NEEDED
Status: DISCONTINUED | OUTPATIENT
Start: 2025-08-12 | End: 2025-08-12

## 2025-08-12 RX ORDER — PROPOFOL 10 MG/ML
INJECTION, EMULSION INTRAVENOUS AS NEEDED
Status: DISCONTINUED | OUTPATIENT
Start: 2025-08-12 | End: 2025-08-12

## 2025-08-12 RX ADMIN — FAMOTIDINE 20 MG: 10 INJECTION, SOLUTION INTRAVENOUS at 08:42

## 2025-08-12 RX ADMIN — DEXAMETHASONE SODIUM PHOSPHATE 8 MG: 4 INJECTION, SOLUTION INTRAMUSCULAR; INTRAVENOUS at 08:42

## 2025-08-12 RX ADMIN — FENTANYL CITRATE 50 MCG: 50 INJECTION, SOLUTION INTRAMUSCULAR; INTRAVENOUS at 08:42

## 2025-08-12 RX ADMIN — DIPHENHYDRAMINE HYDROCHLORIDE 25 MG: 50 INJECTION INTRAMUSCULAR; INTRAVENOUS at 08:42

## 2025-08-12 RX ADMIN — FENTANYL CITRATE 25 MCG: 50 INJECTION, SOLUTION INTRAMUSCULAR; INTRAVENOUS at 09:00

## 2025-08-12 RX ADMIN — ONDANSETRON 4 MG: 2 INJECTION, SOLUTION INTRAMUSCULAR; INTRAVENOUS at 08:42

## 2025-08-12 RX ADMIN — PROPOFOL 200 MG: 10 INJECTION, EMULSION INTRAVENOUS at 08:42

## 2025-08-12 RX ADMIN — KETOROLAC TROMETHAMINE 30 MG: 30 INJECTION, SOLUTION INTRAMUSCULAR at 08:55

## 2025-08-12 RX ADMIN — SODIUM CHLORIDE, SODIUM LACTATE, POTASSIUM CHLORIDE, AND CALCIUM CHLORIDE 50 ML/HR: .6; .31; .03; .02 INJECTION, SOLUTION INTRAVENOUS at 07:37

## 2025-08-12 RX ADMIN — MIDAZOLAM 2 MG: 1 INJECTION INTRAMUSCULAR; INTRAVENOUS at 08:37

## 2025-08-12 RX ADMIN — FENTANYL CITRATE 25 MCG: 50 INJECTION, SOLUTION INTRAMUSCULAR; INTRAVENOUS at 09:08

## 2025-08-12 RX ADMIN — CEFAZOLIN SODIUM 2 G: 2 SOLUTION INTRAVENOUS at 08:47

## 2025-08-12 RX ADMIN — SCOPALAMINE 1 PATCH: 1 PATCH, EXTENDED RELEASE TRANSDERMAL at 08:37

## 2025-08-12 SDOH — HEALTH STABILITY: MENTAL HEALTH: CURRENT SMOKER: 0

## 2025-08-12 ASSESSMENT — PAIN - FUNCTIONAL ASSESSMENT
PAIN_FUNCTIONAL_ASSESSMENT: UNABLE TO SELF-REPORT
PAIN_FUNCTIONAL_ASSESSMENT: UNABLE TO SELF-REPORT
PAIN_FUNCTIONAL_ASSESSMENT: 0-10

## 2025-08-12 ASSESSMENT — COLUMBIA-SUICIDE SEVERITY RATING SCALE - C-SSRS
1. IN THE PAST MONTH, HAVE YOU WISHED YOU WERE DEAD OR WISHED YOU COULD GO TO SLEEP AND NOT WAKE UP?: NO
6. HAVE YOU EVER DONE ANYTHING, STARTED TO DO ANYTHING, OR PREPARED TO DO ANYTHING TO END YOUR LIFE?: NO
2. HAVE YOU ACTUALLY HAD ANY THOUGHTS OF KILLING YOURSELF?: NO

## 2025-08-12 ASSESSMENT — PAIN SCALES - GENERAL
PAINLEVEL_OUTOF10: 0 - NO PAIN
PAINLEVEL_OUTOF10: 0 - NO PAIN
PAIN_LEVEL: 0
PAINLEVEL_OUTOF10: 0 - NO PAIN

## 2025-08-18 ENCOUNTER — OFFICE VISIT (OUTPATIENT)
Dept: ORTHOPEDIC SURGERY | Facility: CLINIC | Age: 60
End: 2025-08-18
Payer: COMMERCIAL

## 2025-08-18 DIAGNOSIS — S83.241A TEAR OF MEDIAL MENISCUS OF RIGHT KNEE, CURRENT, UNSPECIFIED TEAR TYPE, INITIAL ENCOUNTER: Primary | ICD-10-CM

## 2025-08-18 PROCEDURE — 99212 OFFICE O/P EST SF 10 MIN: CPT | Performed by: PHYSICIAN ASSISTANT

## 2025-08-18 PROCEDURE — 99024 POSTOP FOLLOW-UP VISIT: CPT | Performed by: PHYSICIAN ASSISTANT

## 2025-08-20 ASSESSMENT — PROMIS GLOBAL HEALTH SCALE
RATE_QUALITY_OF_LIFE: VERY GOOD
CARRYOUT_SOCIAL_ACTIVITIES: EXCELLENT
RATE_GENERAL_HEALTH: VERY GOOD
RATE_PHYSICAL_HEALTH: VERY GOOD
EMOTIONAL_PROBLEMS: RARELY
RATE_AVERAGE_FATIGUE: MILD
RATE_MENTAL_HEALTH: VERY GOOD
RATE_AVERAGE_PAIN: 1
CARRYOUT_PHYSICAL_ACTIVITIES: COMPLETELY
RATE_SOCIAL_SATISFACTION: VERY GOOD

## 2025-08-25 ENCOUNTER — APPOINTMENT (OUTPATIENT)
Dept: PRIMARY CARE | Facility: CLINIC | Age: 60
End: 2025-08-25
Payer: COMMERCIAL

## 2025-08-25 VITALS
OXYGEN SATURATION: 96 % | DIASTOLIC BLOOD PRESSURE: 74 MMHG | SYSTOLIC BLOOD PRESSURE: 121 MMHG | HEART RATE: 65 BPM | WEIGHT: 117.6 LBS | HEIGHT: 63 IN | BODY MASS INDEX: 20.84 KG/M2 | TEMPERATURE: 98.4 F

## 2025-08-25 DIAGNOSIS — Z00.00 ROUTINE ADULT HEALTH MAINTENANCE: ICD-10-CM

## 2025-08-25 DIAGNOSIS — F41.9 ANXIETY: ICD-10-CM

## 2025-08-25 DIAGNOSIS — Z85.828 H/O NONMELANOMA SKIN CANCER: ICD-10-CM

## 2025-08-25 DIAGNOSIS — Z12.31 ENCOUNTER FOR SCREENING MAMMOGRAM FOR BREAST CANCER: ICD-10-CM

## 2025-08-25 DIAGNOSIS — E55.9 VITAMIN D DEFICIENCY: ICD-10-CM

## 2025-08-25 DIAGNOSIS — E53.8 VITAMIN B12 DEFICIENCY: ICD-10-CM

## 2025-08-25 DIAGNOSIS — Z12.4 SCREENING FOR CERVICAL CANCER: Primary | ICD-10-CM

## 2025-08-25 DIAGNOSIS — E87.1 HYPONATREMIA: ICD-10-CM

## 2025-08-25 DIAGNOSIS — R79.89 LOW SERUM TOTAL PROTEIN LEVEL: ICD-10-CM

## 2025-08-25 DIAGNOSIS — M85.9 DISORDER OF BONE DENSITY AND STRUCTURE, UNSPECIFIED: ICD-10-CM

## 2025-08-25 PROBLEM — S83.222A PERIPHERAL TEAR OF MEDIAL MENISCUS, CURRENT INJURY, LEFT KNEE, INITIAL ENCOUNTER: Status: RESOLVED | Noted: 2024-09-24 | Resolved: 2025-08-25

## 2025-08-25 PROBLEM — M25.562 CHRONIC PAIN OF LEFT KNEE: Status: RESOLVED | Noted: 2024-10-14 | Resolved: 2025-08-25

## 2025-08-25 PROBLEM — Z01.818 ENCOUNTER FOR PREOPERATIVE ASSESSMENT: Status: RESOLVED | Noted: 2024-10-14 | Resolved: 2025-08-25

## 2025-08-25 PROBLEM — G89.29 CHRONIC PAIN OF LEFT KNEE: Status: RESOLVED | Noted: 2024-10-14 | Resolved: 2025-08-25

## 2025-08-25 PROBLEM — S83.241A OTHER TEAR OF MEDIAL MENISCUS, CURRENT INJURY, RIGHT KNEE, INITIAL ENCOUNTER: Status: RESOLVED | Noted: 2025-07-21 | Resolved: 2025-08-25

## 2025-08-25 PROCEDURE — 1036F TOBACCO NON-USER: CPT | Performed by: INTERNAL MEDICINE

## 2025-08-25 PROCEDURE — 3008F BODY MASS INDEX DOCD: CPT | Performed by: INTERNAL MEDICINE

## 2025-08-25 PROCEDURE — 99396 PREV VISIT EST AGE 40-64: CPT | Performed by: INTERNAL MEDICINE

## 2025-08-25 RX ORDER — CREATINE 100 %
POWDER (GRAM) MISCELLANEOUS
Start: 2025-08-25

## 2025-08-25 RX ORDER — BUPROPION HYDROCHLORIDE 150 MG/1
150 TABLET ORAL EVERY MORNING
Qty: 90 TABLET | Refills: 3 | Status: SHIPPED | OUTPATIENT
Start: 2025-08-25 | End: 2026-08-25

## 2025-08-25 RX ORDER — ALPRAZOLAM 0.5 MG/1
0.5 TABLET ORAL 3 TIMES DAILY PRN
Qty: 21 TABLET | Refills: 0 | Status: SHIPPED | OUTPATIENT
Start: 2025-08-25 | End: 2025-09-01

## 2025-08-25 ASSESSMENT — PATIENT HEALTH QUESTIONNAIRE - PHQ9
SUM OF ALL RESPONSES TO PHQ9 QUESTIONS 1 AND 2: 0
1. LITTLE INTEREST OR PLEASURE IN DOING THINGS: NOT AT ALL
2. FEELING DOWN, DEPRESSED OR HOPELESS: NOT AT ALL

## 2025-08-27 PROBLEM — R94.4 DECREASED CALCULATED GFR: Status: ACTIVE | Noted: 2025-08-27

## 2025-08-27 LAB
25(OH)D3+25(OH)D2 SERPL-MCNC: 38 NG/ML (ref 30–100)
ANION GAP SERPL CALCULATED.4IONS-SCNC: 10 MMOL/L (CALC) (ref 7–17)
BUN SERPL-MCNC: 12 MG/DL (ref 7–25)
BUN/CREAT SERPL: NORMAL (CALC) (ref 6–22)
CALCIUM SERPL-MCNC: 9.3 MG/DL (ref 8.6–10.4)
CHLORIDE SERPL-SCNC: 101 MMOL/L (ref 98–110)
CHOLEST SERPL-MCNC: 216 MG/DL
CHOLEST/HDLC SERPL: 3 (CALC)
CO2 SERPL-SCNC: 28 MMOL/L (ref 20–32)
CREAT SERPL-MCNC: 0.91 MG/DL (ref 0.5–1.05)
EGFRCR SERPLBLD CKD-EPI 2021: 72 ML/MIN/1.73M2
GLUCOSE SERPL-MCNC: 83 MG/DL (ref 65–99)
HDLC SERPL-MCNC: 72 MG/DL
LDLC SERPL CALC-MCNC: 129 MG/DL (CALC)
NONHDLC SERPL-MCNC: 144 MG/DL (CALC)
POTASSIUM SERPL-SCNC: 4.5 MMOL/L (ref 3.5–5.3)
PROT SERPL-MCNC: 6.4 G/DL (ref 6.1–8.1)
SODIUM SERPL-SCNC: 139 MMOL/L (ref 135–146)
TRIGL SERPL-MCNC: 62 MG/DL
TSH SERPL-ACNC: 2.95 MIU/L (ref 0.4–4.5)
VIT B12 SERPL-MCNC: 553 PG/ML (ref 200–1100)

## 2025-08-31 LAB
ATRIAL RATE: 63 BPM
P AXIS: 64 DEGREES
P OFFSET: 208 MS
P ONSET: 155 MS
PR INTERVAL: 134 MS
Q ONSET: 222 MS
QRS COUNT: 10 BEATS
QRS DURATION: 76 MS
QT INTERVAL: 402 MS
QTC CALCULATION(BAZETT): 411 MS
QTC FREDERICIA: 408 MS
R AXIS: 18 DEGREES
T AXIS: 20 DEGREES
T OFFSET: 423 MS
VENTRICULAR RATE: 63 BPM

## 2025-09-03 LAB
CYTOLOGY CMNT CVX/VAG CYTO-IMP: NORMAL
HPV HR 12 DNA GENITAL QL NAA+PROBE: NEGATIVE
HPV HR GENOTYPES PNL CVX NAA+PROBE: NEGATIVE
HPV16 DNA SPEC QL NAA+PROBE: NEGATIVE
HPV18 DNA SPEC QL NAA+PROBE: NEGATIVE
LAB AP HPV GENOTYPE QUESTION: YES
LAB AP HPV HR: NORMAL
LABORATORY COMMENT REPORT: NORMAL
PATH REPORT.TOTAL CANCER: NORMAL

## 2026-09-17 ENCOUNTER — APPOINTMENT (OUTPATIENT)
Dept: PRIMARY CARE | Facility: CLINIC | Age: 61
End: 2026-09-17
Payer: COMMERCIAL

## (undated) DEVICE — COVER, MAYO STAND, W/PAD, 23 IN, DISPOSABLE, PLASTIC, LF, STERILE

## (undated) DEVICE — STRAP, ARM BOARD, 32 X 1.5

## (undated) DEVICE — GLOVE, SURGICAL, PROTEXIS PI , 7.0, PF, LF

## (undated) DEVICE — BOWL, BASIN, 32 OZ, STERILE

## (undated) DEVICE — TUBING, SUCTION, 6MM X 10, CLEAN N-COND

## (undated) DEVICE — SUTURE, ETHILON, 3-0, 18 IN, PS1, BLACK

## (undated) DEVICE — CUFF, TOURNIQUET, 30 X 4, DUAL PORT/SNGL BLADDER, DISP, LF

## (undated) DEVICE — MAT, FLOOR, STANDARD FLUID BARRIER, 32X44, GREEN

## (undated) DEVICE — SOLUTION, IRRIGATION, USP, SODIUM CHLORIDE 0.9%, 3000 ML

## (undated) DEVICE — PADDING, CAST, SPECIALIST, 6 IN X 4 YD, STERILE

## (undated) DEVICE — DRESSING, ABDOMINAL PAD, CURITY, 7.5 X 8 IN

## (undated) DEVICE — STRAP, VELCRO, BODY, 4 X 60IN, NS

## (undated) DEVICE — HOLSTER, ELECTROSURGERY ACCESSORY, STERILE

## (undated) DEVICE — GLOVE, SURGICAL, PROTEXIS PI , 7.5, PF, LF

## (undated) DEVICE — PROBE, CRUISE ENERGY, ARTHOSCOPIC SERFAS 90-S 4.0MM

## (undated) DEVICE — Device

## (undated) DEVICE — TUBING, PUMP REDEUCE 8FT STERILE

## (undated) DEVICE — DRESSING, GAUZE, PETROLATUM, STRIP, XEROFORM, 1 X 8 IN, STERILE

## (undated) DEVICE — BANDAGE, ELASTIC, MATRIX, SELF-CLOSURE, 6 IN X 5 YD, LF

## (undated) DEVICE — TUBING, PATIENT 8FT STERILE

## (undated) DEVICE — GLOVE, SURGICAL, PROTEXIS PI BLUE W/NEUTHERA, 7.0, PF, LF

## (undated) DEVICE — PREP, IODOPHOR, W/ALCOHOL, DURAPREP, W/APPLICATOR, 26 CC